# Patient Record
Sex: MALE | Race: WHITE | NOT HISPANIC OR LATINO | Employment: FULL TIME | URBAN - METROPOLITAN AREA
[De-identification: names, ages, dates, MRNs, and addresses within clinical notes are randomized per-mention and may not be internally consistent; named-entity substitution may affect disease eponyms.]

---

## 2017-08-29 ENCOUNTER — ALLSCRIPTS OFFICE VISIT (OUTPATIENT)
Dept: OTHER | Facility: OTHER | Age: 27
End: 2017-08-29

## 2017-08-30 DIAGNOSIS — R63.5 ABNORMAL WEIGHT GAIN: ICD-10-CM

## 2017-08-30 DIAGNOSIS — E66.9 OBESITY: ICD-10-CM

## 2017-08-30 DIAGNOSIS — F31.30 BIPOLAR DISORDER, CURRENT EPISODE DEPRESSED, MILD OR MODERATE SEVERITY, UNSPECIFIED (HCC): ICD-10-CM

## 2017-08-30 DIAGNOSIS — Z00.00 ENCOUNTER FOR GENERAL ADULT MEDICAL EXAMINATION WITHOUT ABNORMAL FINDINGS: ICD-10-CM

## 2017-08-30 DIAGNOSIS — E78.5 HYPERLIPIDEMIA: ICD-10-CM

## 2017-10-19 ENCOUNTER — GENERIC CONVERSION - ENCOUNTER (OUTPATIENT)
Dept: OTHER | Facility: OTHER | Age: 27
End: 2017-10-19

## 2017-10-20 ENCOUNTER — GENERIC CONVERSION - ENCOUNTER (OUTPATIENT)
Dept: OTHER | Facility: OTHER | Age: 27
End: 2017-10-20

## 2017-10-20 DIAGNOSIS — Z78.9 OTHER SPECIFIED HEALTH STATUS (CODE): ICD-10-CM

## 2017-10-26 ENCOUNTER — GENERIC CONVERSION - ENCOUNTER (OUTPATIENT)
Dept: OTHER | Facility: OTHER | Age: 27
End: 2017-10-26

## 2018-01-10 NOTE — PROGRESS NOTES
Assessment    1  Encounter for preventive health examination (V70 0) (Z00 00)   2  Obesity (BMI 30-39 9) (278 00) (E66 9)   3  Weight gain (783 1) (R63 5)   4  Hyperlipidemia (272 4) (E78 5)   5  Bipolar depression (296 50) (F31 30)    Plan  Bipolar depression, Health Maintenance, Hyperlipidemia, Obesity (BMI 30-39 9), Weight  gain    · Begin or continue regular aerobic exercise  Gradually work up to at least {count1}  sessions of {dur1} of exercise a week ; Status:Complete;   Done: 79JGG4323 03:08PM   · Continue with our present treatment plan ; Status:Complete;   Done: 91Bzw5459  03:08PM   · Eat a low fat and low cholesterol diet ; Status:Complete;   Done: 83GFD5768 03:08PM   · We recommend that you bring your body mass index down to 26 ; Status:Complete;    Done: 21BEX1642 03:08PM   · Follow-up visit in 1 month Evaluation and Treatment  Follow-up  Status: Hold For -  Scheduling  Requested for: 29Aug2017   · (1) CBC/ PLT (NO DIFF); Status:Active; Requested for:70Kuj8123;    · (1) COMPREHENSIVE METABOLIC PANEL; Status:Active; Requested for:80Spb6217;    · (1) CORTISOL AM SPECIMEN; Status:Active; Requested for:45Opx3626;    · (1) LIPID PANEL FASTING W DIRECT LDL REFLEX; Status:Active; Requested  for:53Wst1014;    · (1) TSH; Status:Active; Requested for:45Xwe1358;    · (1) URINALYSIS (will reflex a microscopy if leukocytes, occult blood, protein or nitrites  are not within normal limits); Status:Active; Requested for:07Khq0681;     Discussion/Summary    #1  Health maintenance, exam normal except for weight  #2  Hyperlipidemia, check blood work  #3  Obesity/weight gain, check blood work  #4  Bipolar depression, currently without symptoms offered counseling patient declined at the present time  #5  Patient to return in one month discussed blood work and monitor weight  The treatment plan was reviewed with the patient/guardian   The patient/guardian understands and agrees with the treatment plan     Self Referrals: No      Chief Complaint  pt here for general P E  wants to discuss why pt can not loose weight  History of Present Illness  HPI: Patient here for a "routine checkup"  Patient also questions he's trying to lose weight and is gaining weight  I'll blood work will be ordered  Patient is any history of hyperlipidemia, will recheck this  Patient has a history of anxiety/bipolar no current symptoms at the present time  He says it "comes and goes"      Review of Systems    Constitutional: as noted in HPI  Eyes: No complaints of eye pain, no red eyes, no discharge from eyes, no itchy eyes  ENT: no complaints of earache, no hearing loss, no nosebleeds, no nasal discharge, no sore throat, no hoarseness  Cardiovascular: No complaints of slow heart rate, no fast heart rate, no chest pain, no palpitations, no leg claudication, no lower extremity  Respiratory: No complaints of shortness of breath, no wheezing, no cough, no SOB on exertion, no orthopnea or PND  Gastrointestinal: No complaints of abdominal pain, no constipation, no nausea or vomiting, no diarrhea or bloody stools  Genitourinary: No complaints of dysuria, no incontinence, no hesitancy, no nocturia, no genital lesion, no testicular pain  Musculoskeletal: No complaints of arthralgia, no myalgias, no joint swelling or stiffness, no limb pain or swelling  Integumentary: No complaints of skin rash or skin lesions, no itching, no skin wound, no dry skin  Neurological: No compliants of headache, no confusion, no convulsions, no numbness or tingling, no dizziness or fainting, no limb weakness, no difficulty walking  Psychiatric: as noted in HPI  Endocrine: No complaints of proptosis, no hot flashes, no muscle weakness, no erectile dysfunction, no deepening of the voice, no feelings of weakness  Hematologic/Lymphatic: No complaints of swollen glands, no swollen glands in the neck, does not bleed easily, no easy bruising  Active Problems    1  Bipolar depression (296 50) (F31 30)   2  Hyperlipidemia (272 4) (E78 5)    Surgical History    · History of Shoulder Surgery    Family History  Mother    · Family history of Bipolar disorder (296 80) (F31 9)    Social History    · Denied: History of Alcohol use   · Denied: History of Drug use   · Never a smoker    Current Meds   1  Fish Oil Concentrate 1000 MG Oral Capsule; as directed on package; Therapy: 04Vpr7328 to (Last Rx:52Muq6753) Ordered    Allergies    1  Penicillins    Vitals   Recorded: 29Aug2017 03:00PM Recorded: 29Aug2017 02:58PM   Heart Rate  84   Systolic  147   Diastolic  90   Weight 451 lb     BMI Calculated 30 74    BSA Calculated 2 3      Physical Exam    Constitutional   General appearance: No acute distress, well appearing and well nourished  Eyes   Conjunctiva and lids: No swelling, erythema, or discharge  Pupils and irises: Equal, round and reactive to light  Ears, Nose, Mouth, and Throat   External inspection of ears and nose: Normal     Otoscopic examination: Tympanic membrance translucent with normal light reflex  Canals patent without erythema  Pulmonary   Respiratory effort: No increased work of breathing or signs of respiratory distress  Auscultation of lungs: Clear to auscultation  Cardiovascular   Palpation of heart: Normal PMI, no thrills  Auscultation of heart: Normal rate and rhythm, normal S1 and S2, without murmurs  Examination of extremities for edema and/or varicosities: Normal     Abdomen   Abdomen: Non-tender, no masses  Liver and spleen: No hepatomegaly or splenomegaly  Lymphatic   Palpation of lymph nodes in neck: No lymphadenopathy  Musculoskeletal   Gait and station: Normal     Skin Warm and dry  Neurologic   Cranial nerves: Cranial nerves 2-12 intact  Reflexes: 2+ and symmetric  Sensation: No sensory loss      Psychiatric   Orientation to person, place and time: Normal     Mood and affect: Normal        Future Appointments    Date/Time Provider Specialty Site   09/14/2017 04:00 PM Saida Hawkins MD Urology [address]     Signatures   Electronically signed by : Rei Nixon DO; Aug 29 2017  3:10PM EST                       (Author)

## 2018-01-10 NOTE — MISCELLANEOUS
Provider Comments  Provider Comments:   Patient did not show up for his scheduled appointment      Signatures   Electronically signed by : Odessa Mccann DO; Jun 17 2016  8:07AM EST                       (Author)

## 2018-01-10 NOTE — MISCELLANEOUS
Message  [facility] [address] [place] [address], NO DR OR DESTINY # ON PRESCRIPTION FOR [address]  PT SCHED FOR VAS 10/20  PRESCRIPTION PER DR FIGUEROA, INFO PROVIDED TO PHARM  Active Problems    1  Bipolar depression (296 50) (F31 30)   2  Hyperlipidemia (272 4) (E78 5)   3  Obesity (BMI 30-39 9) (278 00) (E66 9)   4  Weight gain (783 1) (R63 5)    Current Meds   1  Magnesium CAPS;    Therapy: (Recorded:45Jpw7377) to Recorded    Signatures   Electronically signed by : Ron Drew RN; Oct 19 2017  3:32PM EST                       (Author)

## 2018-01-11 NOTE — MISCELLANEOUS
Provider Comments  Provider Comments:   Patient did not show for scheduled appointment      Signatures   Electronically signed by : Andressa Fonseca DO; Nov 29 2016  4:17PM EST                       (Author)

## 2018-01-12 DIAGNOSIS — Z98.52 VASECTOMY STATUS: ICD-10-CM

## 2018-01-14 VITALS
SYSTOLIC BLOOD PRESSURE: 126 MMHG | HEART RATE: 84 BPM | DIASTOLIC BLOOD PRESSURE: 90 MMHG | WEIGHT: 233 LBS | BODY MASS INDEX: 30.74 KG/M2

## 2018-01-22 VITALS
DIASTOLIC BLOOD PRESSURE: 82 MMHG | WEIGHT: 225 LBS | BODY MASS INDEX: 29.82 KG/M2 | HEIGHT: 73 IN | SYSTOLIC BLOOD PRESSURE: 124 MMHG

## 2018-05-15 ENCOUNTER — OFFICE VISIT (OUTPATIENT)
Dept: FAMILY MEDICINE CLINIC | Facility: CLINIC | Age: 28
End: 2018-05-15
Payer: COMMERCIAL

## 2018-05-15 VITALS
SYSTOLIC BLOOD PRESSURE: 120 MMHG | HEART RATE: 108 BPM | WEIGHT: 229 LBS | TEMPERATURE: 99.7 F | DIASTOLIC BLOOD PRESSURE: 94 MMHG | BODY MASS INDEX: 29.39 KG/M2 | HEIGHT: 74 IN

## 2018-05-15 DIAGNOSIS — R80.9 PROTEINURIA, UNSPECIFIED TYPE: ICD-10-CM

## 2018-05-15 DIAGNOSIS — R35.0 URINARY FREQUENCY: Primary | ICD-10-CM

## 2018-05-15 DIAGNOSIS — Z13.6 SCREENING FOR HEART DISEASE: ICD-10-CM

## 2018-05-15 PROBLEM — E66.9 OBESITY (BMI 30-39.9): Status: ACTIVE | Noted: 2017-08-29

## 2018-05-15 LAB
SL AMB  POCT GLUCOSE, UA: ABNORMAL
SL AMB LEUKOCYTE ESTERASE,UA: ABNORMAL
SL AMB POCT BILIRUBIN,UA: ABNORMAL
SL AMB POCT BLOOD,UA: ABNORMAL
SL AMB POCT CLARITY,UA: CLEAR
SL AMB POCT COLOR,UA: YELLOW
SL AMB POCT KETONES,UA: ABNORMAL
SL AMB POCT NITRITE,UA: ABNORMAL
SL AMB POCT PH,UA: 6
SL AMB POCT SPECIFIC GRAVITY,UA: 1.02
SL AMB POCT URINE PROTEIN: ABNORMAL
SL AMB POCT UROBILINOGEN: 0.2

## 2018-05-15 PROCEDURE — 81002 URINALYSIS NONAUTO W/O SCOPE: CPT | Performed by: PHYSICIAN ASSISTANT

## 2018-05-15 PROCEDURE — 99214 OFFICE O/P EST MOD 30 MIN: CPT | Performed by: PHYSICIAN ASSISTANT

## 2018-05-15 NOTE — PATIENT INSTRUCTIONS
1  Urinary frequency-this may likely be secondary to the quantity of fluids that he is drinking or bladder spasms related to caffeine consumption  We will assess labs to rule out diabetes and renal function problems  I will also collect a 24 hour urine protein secondary to his trace protein on urinalysis  2   Proteinuria-check 24 hour protein collection as above  3   Screening for heart disease-will assess lipid panel with labs

## 2018-05-15 NOTE — PROGRESS NOTES
Assessment/Plan:  Patient Instructions   1  Urinary frequency-this may likely be secondary to the quantity of fluids that he is drinking or bladder spasms related to caffeine consumption  We will assess labs to rule out diabetes and renal function problems  I will also collect a 24 hour urine protein secondary to his trace protein on urinalysis  2   Proteinuria-check 24 hour protein collection as above  3   Screening for heart disease-will assess lipid panel with labs  Diagnoses and all orders for this visit:    Urinary frequency  -     POCT urine dip  -     Protein, Total W/Creat, 24 Hour Urine  -     CBC and differential  -     Comprehensive metabolic panel  -     TSH, 3rd generation with T4 reflex  -     PSA, Total Screen  -     Lipid Panel with Direct LDL reflex    Proteinuria, unspecified type    Screening for heart disease  -     Protein, Total W/Creat, 24 Hour Urine  -     CBC and differential  -     Comprehensive metabolic panel  -     TSH, 3rd generation with T4 reflex  -     PSA, Total Screen  -     Lipid Panel with Direct LDL reflex          Subjective:   c/o urinates approx every 45 minutes  Gets sudden urge and pressure to go  Onset a few months  He drinks 24oz coffee, 1 soda and 8-15 bottles of water daily  mjs     Patient ID: Trent Parker is a 28 y o  male  HPI:  This is a 41-year-old gentleman that presents to the office with concerns over urinary frequency  He has been having regular urination every 45 minutes  He has episodes where he feels that he has to go suddenly and quickly or his bladder will explode  He typically does have a large quantity of urine  He drinks a lot throughout the course of the day, typically 8, 16 oz bottles of water and some soda and a 24 oz coffee mixed in  He states that on an hour and a half car trip E had to stop twice recently to urinate  He does not have any burning or discomfort with urination          The following portions of the patient's history were reviewed and updated as appropriate: allergies, current medications, past family history, past medical history, past social history, past surgical history and problem list     Review of Systems   Constitutional: Negative for chills, fatigue and fever  HENT: Negative for congestion, ear pain and sinus pressure  Eyes: Negative for visual disturbance  Respiratory: Negative for cough, chest tightness and shortness of breath  Cardiovascular: Negative for chest pain and palpitations  Gastrointestinal: Negative for diarrhea, nausea and vomiting  Endocrine: Negative for polyuria  Genitourinary: Positive for urgency  Negative for dysuria and frequency  Urinary frequency   Musculoskeletal: Negative for arthralgias and myalgias  Skin: Negative for pallor and rash  Neurological: Negative for dizziness, weakness, light-headedness, numbness and headaches  Psychiatric/Behavioral: Negative for agitation, behavioral problems and sleep disturbance  All other systems reviewed and are negative  Objective:      /94   Pulse (!) 108   Temp 99 7 °F (37 6 °C)   Ht 6' 1 5" (1 867 m)   Wt 104 kg (229 lb)   BMI 29 80 kg/m²          Physical Exam   Constitutional: He is oriented to person, place, and time  He appears well-developed and well-nourished  No distress  HENT:   Head: Normocephalic and atraumatic  Right Ear: External ear normal    Left Ear: External ear normal    Nose: Nose normal    Mouth/Throat: Oropharynx is clear and moist  No oropharyngeal exudate  Eyes: Conjunctivae and EOM are normal  Pupils are equal, round, and reactive to light  Neck: Normal range of motion  Neck supple  No tracheal deviation present  No thyromegaly present  Cardiovascular: Normal rate, regular rhythm and normal heart sounds  Exam reveals no friction rub  No murmur heard  Pulmonary/Chest: Effort normal and breath sounds normal  No respiratory distress  He has no wheezes  He has no rales  Abdominal: Soft  Bowel sounds are normal  He exhibits no distension  There is no tenderness  There is no rebound and no guarding  Musculoskeletal: Normal range of motion  He exhibits no edema or tenderness  Lymphadenopathy:     He has no cervical adenopathy  Neurological: He is alert and oriented to person, place, and time  No cranial nerve deficit  Coordination normal    Skin: Skin is warm and dry  No rash noted  No erythema  Psychiatric: He has a normal mood and affect  His behavior is normal  Thought content normal    Nursing note and vitals reviewed

## 2018-05-30 ENCOUNTER — APPOINTMENT (OUTPATIENT)
Dept: LAB | Facility: MEDICAL CENTER | Age: 28
End: 2018-05-30
Payer: COMMERCIAL

## 2018-05-30 DIAGNOSIS — Z13.6 SCREENING FOR ISCHEMIC HEART DISEASE: ICD-10-CM

## 2018-05-30 DIAGNOSIS — R35.0 URINARY FREQUENCY: Primary | ICD-10-CM

## 2018-05-30 LAB
ALBUMIN SERPL BCP-MCNC: 4 G/DL (ref 3.5–5)
ALP SERPL-CCNC: 55 U/L (ref 46–116)
ALT SERPL W P-5'-P-CCNC: 61 U/L (ref 12–78)
ANION GAP SERPL CALCULATED.3IONS-SCNC: 7 MMOL/L (ref 4–13)
AST SERPL W P-5'-P-CCNC: 26 U/L (ref 5–45)
BASOPHILS # BLD AUTO: 0.03 THOUSANDS/ΜL (ref 0–0.1)
BASOPHILS NFR BLD AUTO: 1 % (ref 0–1)
BILIRUB SERPL-MCNC: 0.51 MG/DL (ref 0.2–1)
BUN SERPL-MCNC: 9 MG/DL (ref 5–25)
CALCIUM SERPL-MCNC: 8.8 MG/DL (ref 8.3–10.1)
CHLORIDE SERPL-SCNC: 105 MMOL/L (ref 100–108)
CHOLEST SERPL-MCNC: 184 MG/DL (ref 50–200)
CO2 SERPL-SCNC: 28 MMOL/L (ref 21–32)
CREAT 24H UR-MRATE: 3.6 G/24HR (ref 0.8–1.8)
CREAT SERPL-MCNC: 0.88 MG/DL (ref 0.6–1.3)
EOSINOPHIL # BLD AUTO: 0.12 THOUSAND/ΜL (ref 0–0.61)
EOSINOPHIL NFR BLD AUTO: 2 % (ref 0–6)
ERYTHROCYTE [DISTWIDTH] IN BLOOD BY AUTOMATED COUNT: 12.3 % (ref 11.6–15.1)
GFR SERPL CREATININE-BSD FRML MDRD: 117 ML/MIN/1.73SQ M
GLUCOSE P FAST SERPL-MCNC: 91 MG/DL (ref 65–99)
HCT VFR BLD AUTO: 48.7 % (ref 36.5–49.3)
HDLC SERPL-MCNC: 36 MG/DL (ref 40–60)
HGB BLD-MCNC: 15.5 G/DL (ref 12–17)
IMM GRANULOCYTES # BLD AUTO: 0.01 THOUSAND/UL (ref 0–0.2)
IMM GRANULOCYTES NFR BLD AUTO: 0 % (ref 0–2)
LDLC SERPL CALC-MCNC: 115 MG/DL (ref 0–100)
LYMPHOCYTES # BLD AUTO: 1.96 THOUSANDS/ΜL (ref 0.6–4.47)
LYMPHOCYTES NFR BLD AUTO: 32 % (ref 14–44)
MCH RBC QN AUTO: 29.9 PG (ref 26.8–34.3)
MCHC RBC AUTO-ENTMCNC: 31.8 G/DL (ref 31.4–37.4)
MCV RBC AUTO: 94 FL (ref 82–98)
MONOCYTES # BLD AUTO: 0.62 THOUSAND/ΜL (ref 0.17–1.22)
MONOCYTES NFR BLD AUTO: 10 % (ref 4–12)
NEUTROPHILS # BLD AUTO: 3.35 THOUSANDS/ΜL (ref 1.85–7.62)
NEUTS SEG NFR BLD AUTO: 55 % (ref 43–75)
NRBC BLD AUTO-RTO: 0 /100 WBCS
PLATELET # BLD AUTO: 234 THOUSANDS/UL (ref 149–390)
PMV BLD AUTO: 11.6 FL (ref 8.9–12.7)
POTASSIUM SERPL-SCNC: 4.4 MMOL/L (ref 3.5–5.3)
PROT 24H UR-MCNC: 242 MG/24 HRS (ref 40–150)
PROT SERPL-MCNC: 7.6 G/DL (ref 6.4–8.2)
PSA SERPL-MCNC: 0.3 NG/ML (ref 0–4)
RBC # BLD AUTO: 5.19 MILLION/UL (ref 3.88–5.62)
SODIUM SERPL-SCNC: 140 MMOL/L (ref 136–145)
SPECIMEN VOL UR: 2200 ML
SPECIMEN VOL UR: 2200 ML
TRIGL SERPL-MCNC: 166 MG/DL
TSH SERPL DL<=0.05 MIU/L-ACNC: 1.34 UIU/ML (ref 0.36–3.74)
WBC # BLD AUTO: 6.09 THOUSAND/UL (ref 4.31–10.16)

## 2018-05-30 PROCEDURE — 80061 LIPID PANEL: CPT | Performed by: PHYSICIAN ASSISTANT

## 2018-05-30 PROCEDURE — 82570 ASSAY OF URINE CREATININE: CPT

## 2018-05-30 PROCEDURE — 84156 ASSAY OF PROTEIN URINE: CPT

## 2018-05-30 PROCEDURE — G0103 PSA SCREENING: HCPCS | Performed by: PHYSICIAN ASSISTANT

## 2018-05-30 PROCEDURE — 80053 COMPREHEN METABOLIC PANEL: CPT | Performed by: PHYSICIAN ASSISTANT

## 2018-05-30 PROCEDURE — 85025 COMPLETE CBC W/AUTO DIFF WBC: CPT | Performed by: PHYSICIAN ASSISTANT

## 2018-05-30 PROCEDURE — 36415 COLL VENOUS BLD VENIPUNCTURE: CPT | Performed by: PHYSICIAN ASSISTANT

## 2018-05-30 PROCEDURE — 84443 ASSAY THYROID STIM HORMONE: CPT | Performed by: PHYSICIAN ASSISTANT

## 2018-06-04 DIAGNOSIS — R80.9 URINE PROTEIN INCREASED: Primary | ICD-10-CM

## 2018-06-12 ENCOUNTER — OFFICE VISIT (OUTPATIENT)
Dept: UROLOGY | Facility: MEDICAL CENTER | Age: 28
End: 2018-06-12
Payer: COMMERCIAL

## 2018-06-12 VITALS
SYSTOLIC BLOOD PRESSURE: 120 MMHG | WEIGHT: 228 LBS | DIASTOLIC BLOOD PRESSURE: 88 MMHG | BODY MASS INDEX: 30.22 KG/M2 | HEIGHT: 73 IN

## 2018-06-12 DIAGNOSIS — N42.82 PROSTATITIS SYNDROME: ICD-10-CM

## 2018-06-12 DIAGNOSIS — R80.9 URINE PROTEIN INCREASED: Primary | ICD-10-CM

## 2018-06-12 LAB
SL AMB  POCT GLUCOSE, UA: NORMAL
SL AMB LEUKOCYTE ESTERASE,UA: NORMAL
SL AMB POCT BILIRUBIN,UA: NORMAL
SL AMB POCT BLOOD,UA: NORMAL
SL AMB POCT CLARITY,UA: CLEAR
SL AMB POCT COLOR,UA: YELLOW
SL AMB POCT KETONES,UA: NORMAL
SL AMB POCT NITRITE,UA: NORMAL
SL AMB POCT PH,UA: 7
SL AMB POCT SPECIFIC GRAVITY,UA: 1.01
SL AMB POCT URINE PROTEIN: NORMAL
SL AMB POCT UROBILINOGEN: 0.2

## 2018-06-12 PROCEDURE — 99214 OFFICE O/P EST MOD 30 MIN: CPT | Performed by: UROLOGY

## 2018-06-12 PROCEDURE — 81003 URINALYSIS AUTO W/O SCOPE: CPT | Performed by: UROLOGY

## 2018-06-12 RX ORDER — SULFAMETHOXAZOLE AND TRIMETHOPRIM 800; 160 MG/1; MG/1
1 TABLET ORAL EVERY 12 HOURS SCHEDULED
Qty: 28 TABLET | Refills: 1 | Status: SHIPPED | OUTPATIENT
Start: 2018-06-12 | End: 2018-06-26

## 2018-06-12 NOTE — LETTER
June 12, 2018     Madelyn Hernandez, DO  [address]    Patient: Aditi Araya   YOB: 1990   Date of Visit: 6/12/2018       Dear Dr Naida Fox: Thank you for referring Juliana Coyle to me for evaluation  Below are my notes for this consultation  If you have questions, please do not hesitate to call me  I look forward to following your patient along with you  Sincerely,        Ryan Melo MD        CC: No Recipients  Ryan Melo MD  6/12/2018  4:26 PM  Sign at close encounter  Assessment/Plan:  1  Prostatitis syndrome-the patient notes an episode of significant pain in the perineum and lower suprapubic portion of the abdomen right immediately after ejaculation during intercourse  The patient denies any penile trauma denies any hematospermia and notes no hematuria  He is voiding adequately albeit with some slight urinary frequency and occasional double voiding  Patient presents for evaluation and treatment  2   Vasectomy performed 1 year ago  Patient has not provided post vasectomy sperm counts, was advised and prefers not to submit them at this time  He is aware of the possibility of vasectomy failure    No problem-specific Assessment & Plan notes found for this encounter  Diagnoses and all orders for this visit:    Urine protein increased  -     POCT urine dip auto non-scope    Prostatitis syndrome  -     sulfamethoxazole-trimethoprim (BACTRIM DS) 800-160 mg per tablet; Take 1 tablet by mouth every 12 (twelve) hours for 14 days    Other orders  -     Magnesium Gluconate (MAGNESIUM 27 PO); Take by mouth as needed          Subjective:      Patient ID: Aditi Araya is a 28 y o  male  HPI 41-year-old male complains of post ejaculatory discomfort on 1 episode  The patient denies any dysuria notes only mild urinary frequency denies urgency incontinence and nocturia  He denies any antecedent trauma gross hematuria    The following portions of the patient's history were reviewed and updated as appropriate: allergies, current medications, past family history, past medical history, past social history, past surgical history and problem list     Review of Systems   Genitourinary:        Proteinuria, will be seeing Nephrology   All other systems reviewed and are negative  Objective:      /88   Ht 6' 1" (1 854 m)   Wt 103 kg (228 lb)   BMI 30 08 kg/m²           Physical Exam   Constitutional: He is oriented to person, place, and time  He appears well-developed and well-nourished  HENT:   Head: Normocephalic and atraumatic  Eyes: Conjunctivae and EOM are normal    Neck: Neck supple  Pulmonary/Chest: Effort normal  No respiratory distress  Abdominal: Soft  Bowel sounds are normal  He exhibits no distension  There is no tenderness  There is no rebound  Genitourinary:   Genitourinary Comments: Phallus normal with a redundant prep is  Meatus patent normally placed  Scrotum normal without cutaneous lesions  Testes and adnexa are palpably normal with some nodularity at the vasectomy sites bilaterally  Epididymis bilaterally is minimally full and nontender  Perineum is palpably normal HALIE reveals a normal anal verge normal anal sphincter tone no palpable rectal masses and a nodular nontender 20 g prostate  Neurological: He is alert and oriented to person, place, and time  Psychiatric: He has a normal mood and affect   His behavior is normal  Judgment and thought content normal

## 2018-06-12 NOTE — PROGRESS NOTES
Assessment/Plan:  1  Prostatitis syndrome-the patient notes an episode of significant pain in the perineum and lower suprapubic portion of the abdomen right immediately after ejaculation during intercourse  The patient denies any penile trauma denies any hematospermia and notes no hematuria  He is voiding adequately albeit with some slight urinary frequency and occasional double voiding  Patient presents for evaluation and treatment  2   Vasectomy performed 1 year ago  Patient has not provided post vasectomy sperm counts, was advised and prefers not to submit them at this time  He is aware of the possibility of vasectomy failure    No problem-specific Assessment & Plan notes found for this encounter  Diagnoses and all orders for this visit:    Urine protein increased  -     POCT urine dip auto non-scope    Prostatitis syndrome  -     sulfamethoxazole-trimethoprim (BACTRIM DS) 800-160 mg per tablet; Take 1 tablet by mouth every 12 (twelve) hours for 14 days    Other orders  -     Magnesium Gluconate (MAGNESIUM 27 PO); Take by mouth as needed          Subjective:      Patient ID: Quintin Guadalupe is a 28 y o  male  HPI 69-year-old male complains of post ejaculatory discomfort on 1 episode  The patient denies any dysuria notes only mild urinary frequency denies urgency incontinence and nocturia  He denies any antecedent trauma gross hematuria  The following portions of the patient's history were reviewed and updated as appropriate: allergies, current medications, past family history, past medical history, past social history, past surgical history and problem list     Review of Systems   Genitourinary:        Proteinuria, will be seeing Nephrology   All other systems reviewed and are negative  Objective:      /88   Ht 6' 1" (1 854 m)   Wt 103 kg (228 lb)   BMI 30 08 kg/m²          Physical Exam   Constitutional: He is oriented to person, place, and time   He appears well-developed and well-nourished  HENT:   Head: Normocephalic and atraumatic  Eyes: Conjunctivae and EOM are normal    Neck: Neck supple  Pulmonary/Chest: Effort normal  No respiratory distress  Abdominal: Soft  Bowel sounds are normal  He exhibits no distension  There is no tenderness  There is no rebound  Genitourinary:   Genitourinary Comments: Phallus normal with a redundant prep is  Meatus patent normally placed  Scrotum normal without cutaneous lesions  Testes and adnexa are palpably normal with some nodularity at the vasectomy sites bilaterally  Epididymis bilaterally is minimally full and nontender  Perineum is palpably normal HALIE reveals a normal anal verge normal anal sphincter tone no palpable rectal masses and a nodular nontender 20 g prostate  Neurological: He is alert and oriented to person, place, and time  Psychiatric: He has a normal mood and affect   His behavior is normal  Judgment and thought content normal

## 2018-07-05 ENCOUNTER — TELEPHONE (OUTPATIENT)
Dept: FAMILY MEDICINE CLINIC | Facility: CLINIC | Age: 28
End: 2018-07-05

## 2018-07-05 NOTE — TELEPHONE ENCOUNTER
FATOU WITH [facility]'S NEPHROLOGY CALLED  PT WAS A NO SHOW FOR THIS APPT ON 7/3/18  THEREFORE THEY HAVE CLOSED THE REFERRAL  IF YOU WOULD STILL LIKE PT SEEN-A NEW REFERRAL WILL NEED TO BE DONE

## 2018-10-30 ENCOUNTER — TELEPHONE (OUTPATIENT)
Dept: NEPHROLOGY | Facility: CLINIC | Age: 28
End: 2018-10-30

## 2018-10-30 NOTE — TELEPHONE ENCOUNTER
Called patient to see if he would like to reschedule his new patient appt he missed back in July and he stated we should call him back in December to schedule when he will have new insurance   I will put him on Decembers recall list

## 2018-12-21 ENCOUNTER — TELEPHONE (OUTPATIENT)
Dept: NEPHROLOGY | Facility: CLINIC | Age: 28
End: 2018-12-21

## 2018-12-21 NOTE — TELEPHONE ENCOUNTER
Left message for patient to see if he would like to schedule the new patient appointment that was missed in July

## 2019-02-05 ENCOUNTER — TELEPHONE (OUTPATIENT)
Dept: NEPHROLOGY | Facility: CLINIC | Age: 29
End: 2019-02-05

## 2019-04-07 ENCOUNTER — TELEPHONE (OUTPATIENT)
Dept: URGENT CARE | Facility: MEDICAL CENTER | Age: 29
End: 2019-04-07

## 2019-04-07 ENCOUNTER — APPOINTMENT (OUTPATIENT)
Dept: RADIOLOGY | Facility: MEDICAL CENTER | Age: 29
End: 2019-04-07
Payer: COMMERCIAL

## 2019-04-07 ENCOUNTER — OFFICE VISIT (OUTPATIENT)
Dept: URGENT CARE | Facility: MEDICAL CENTER | Age: 29
End: 2019-04-07
Payer: COMMERCIAL

## 2019-04-07 ENCOUNTER — TRANSCRIBE ORDERS (OUTPATIENT)
Dept: URGENT CARE | Facility: MEDICAL CENTER | Age: 29
End: 2019-04-07

## 2019-04-07 VITALS
TEMPERATURE: 98 F | RESPIRATION RATE: 16 BRPM | OXYGEN SATURATION: 99 % | DIASTOLIC BLOOD PRESSURE: 70 MMHG | HEART RATE: 76 BPM | SYSTOLIC BLOOD PRESSURE: 126 MMHG

## 2019-04-07 DIAGNOSIS — S79.919A HIP INJURY, INITIAL ENCOUNTER: ICD-10-CM

## 2019-04-07 DIAGNOSIS — S32.426A: Primary | ICD-10-CM

## 2019-04-07 PROCEDURE — 72100 X-RAY EXAM L-S SPINE 2/3 VWS: CPT

## 2019-04-07 PROCEDURE — G0383 LEV 4 HOSP TYPE B ED VISIT: HCPCS | Performed by: FAMILY MEDICINE

## 2019-04-07 PROCEDURE — 73502 X-RAY EXAM HIP UNI 2-3 VIEWS: CPT

## 2019-04-24 ENCOUNTER — TELEPHONE (OUTPATIENT)
Dept: OBGYN CLINIC | Facility: MEDICAL CENTER | Age: 29
End: 2019-04-24

## 2020-02-24 ENCOUNTER — OFFICE VISIT (OUTPATIENT)
Dept: FAMILY MEDICINE CLINIC | Facility: CLINIC | Age: 30
End: 2020-02-24
Payer: COMMERCIAL

## 2020-02-24 VITALS
WEIGHT: 237 LBS | BODY MASS INDEX: 30.42 KG/M2 | HEART RATE: 56 BPM | RESPIRATION RATE: 18 BRPM | SYSTOLIC BLOOD PRESSURE: 120 MMHG | HEIGHT: 74 IN | DIASTOLIC BLOOD PRESSURE: 82 MMHG

## 2020-02-24 DIAGNOSIS — E66.9 OBESITY (BMI 30-39.9): ICD-10-CM

## 2020-02-24 DIAGNOSIS — F31.9 BIPOLAR DEPRESSION (HCC): ICD-10-CM

## 2020-02-24 DIAGNOSIS — R80.9 URINE PROTEIN INCREASED: ICD-10-CM

## 2020-02-24 DIAGNOSIS — E78.5 HYPERLIPIDEMIA, UNSPECIFIED HYPERLIPIDEMIA TYPE: ICD-10-CM

## 2020-02-24 DIAGNOSIS — Z00.00 HEALTH CARE MAINTENANCE: Primary | ICD-10-CM

## 2020-02-24 PROCEDURE — 99395 PREV VISIT EST AGE 18-39: CPT | Performed by: FAMILY MEDICINE

## 2020-02-24 PROCEDURE — 3008F BODY MASS INDEX DOCD: CPT | Performed by: FAMILY MEDICINE

## 2020-02-24 NOTE — PATIENT INSTRUCTIONS
Low Fat Diet   AMBULATORY CARE:   A low-fat diet  is an eating plan that is low in total fat, unhealthy fat, and cholesterol  You may need to follow a low-fat diet if you have trouble digesting or absorbing fat  You may also need to follow this diet if you have high cholesterol  You can also lower your cholesterol by increasing the amount of fiber in your diet  Soluble fiber is a type of fiber that helps to decrease cholesterol levels  Different types of fat in food:   · Limit unhealthy fats  A diet that is high in cholesterol, saturated fat, and trans fat may cause unhealthy cholesterol levels  Unhealthy cholesterol levels increase your risk of heart disease  ¨ Cholesterol:  Limit intake of cholesterol to less than 200 mg per day  Cholesterol is found in meat, eggs, and dairy  ¨ Saturated fat:  Limit saturated fat to less than 7% of your total daily calories  Ask your dietitian how many calories you need each day  Saturated fat is found in butter, cheese, ice cream, whole milk, and palm oil  Saturated fat is also found in meat, such as beef, pork, chicken skin, and processed meats  Processed meats include sausage, hot dogs, and bologna  ¨ Trans fat:  Avoid trans fat as much as possible  Trans fat is used in fried and baked foods  Foods that say trans fat free on the label may still have up to 0 5 grams of trans fat per serving  · Include healthy fats  Replace foods that are high in saturated and trans fat with foods high in healthy fats  This may help to decrease high cholesterol levels  ¨ Monounsaturated fats: These are found in avocados, nuts, and vegetable oils, such as olive, canola, and sunflower oil  ¨ Polyunsaturated fats: These can be found in vegetable oils, such as soybean or corn oil  Omega-3 fats can help to decrease the risk of heart disease  Omega-3 fats are found in fish, such as salmon, herring, trout, and tuna   Omega-3 fats can also be found in plant foods, such as walnuts, flaxseed, soybeans, and canola oil    Foods to limit or avoid:   · Grains:      ¨ Snacks that are made with partially hydrogenated oils, such as chips, regular crackers, and butter-flavored popcorn    ¨ High-fat baked goods, such as biscuits, croissants, doughnuts, pies, cookies, and pastries    · Dairy:      ¨ Whole milk, 2% milk, and yogurt and ice cream made with whole milk    ¨ Half and half creamer, heavy cream, and whipping cream    ¨ Cheese, cream cheese, and sour cream    · Meats and proteins:      ¨ High-fat cuts of meat (T-bone steak, regular hamburger, and ribs)    ¨ Fried meat, poultry (turkey and chicken), and fish    ¨ Poultry (chicken and turkey) with skin    ¨ Cold cuts (salami or bologna), hot dogs, lance, and sausage    ¨ Whole eggs and egg yolks    · Vegetables and fruits with added fat:      ¨ Fried vegetables or vegetables in butter or high-fat sauces, such as cream or cheese sauces    ¨ Fried fruit or fruit served with butter or cream    · Fats:      ¨ Butter, stick margarine, and shortening    ¨ Coconut, palm oil, and palm kernel oil  Foods to include:   · Grains:      ¨ Whole-grain breads, cereals, pasta, and brown rice    ¨ Low-fat crackers and pretzels    · Vegetables and fruits:      ¨ Fresh, frozen, or canned vegetables (no salt or low-sodium)    ¨ Fresh, frozen, dried, or canned fruit (canned in light syrup or fruit juice)    ¨ Avocado    · Low-fat dairy products:      ¨ Nonfat (skim) or 1% milk    ¨ Nonfat or low-fat cheese, yogurt, and cottage cheese    · Meats and proteins:      ¨ Chicken or turkey with no skin    ¨ Baked or broiled fish    ¨ Lean beef and pork (loin, round, extra lean hamburger)    ¨ Beans and peas, unsalted nuts, soy products    ¨ Egg whites and substitutes    ¨ Seeds and nuts    · Fats:      ¨ Unsaturated oil, such as canola, olive, peanut, soybean, or sunflower oil    ¨ Soft or liquid margarine and vegetable oil spread    ¨ Low-fat salad dressing  Other ways to decrease fat:   · Read food labels before you buy foods  Choose foods that have less than 30% of calories from fat  Choose low-fat or fat-free dairy products  Remember that fat free does not mean calorie free  These foods still contain calories, and too many calories can lead to weight gain  · Trim fat from meat and avoid fried food  Trim all visible fat from meat before you cook it  Remove the skin from poultry  Do not gomez meat, fish, or poultry  Bake, roast, boil, or broil these foods instead  Avoid fried foods  Eat a baked potato instead of Western Teresa fries  Steam vegetables instead of sautéing them in butter  · Add less fat to foods  Use imitation lance bits on salads and baked potatoes instead of regular lance bits  Use fat-free or low-fat salad dressings instead of regular dressings  Use low-fat or nonfat butter-flavored topping instead of regular butter or margarine on popcorn and other foods  Ways to decrease fat in recipes:  Replace high-fat ingredients with low-fat or nonfat ones  This may cause baked goods to be drier than usual  You may need to use nonfat cooking spray on pans to prevent food from sticking  You also may need to change the amount of other ingredients, such as water, in the recipe  Try the following:  · Use low-fat or light margarine instead of regular margarine or shortening  · Use lean ground turkey breast or chicken, or lean ground beef (less than 5% fat) instead of hamburger  · Add 1 teaspoon of canola oil to 8 ounces of skim milk instead of using cream or half and half  · Use grated zucchini, carrots, or apples in breads instead of coconut  · Use blenderized, low-fat cottage cheese, plain tofu, or low-fat ricotta cheese instead of cream cheese  · Use 1 egg white and 1 teaspoon of canola oil, or use ¼ cup (2 ounces) of fat-free egg substitute instead of a whole egg       · Replace half of the oil that is called for in a recipe with applesauce when you bake  Use 3 tablespoons of cocoa powder and 1 tablespoon of canola oil instead of a square of baking chocolate  How to increase fiber:  Eat enough high-fiber foods to get 20 to 30 grams of fiber every day  Slowly increase your fiber intake to avoid stomach cramps, gas, and other problems  · Eat 3 ounces of whole-grain foods each day  An ounce is about 1 slice of bread  Eat whole-grain breads, such as whole-wheat bread  Whole wheat, whole-wheat flour, or other whole grains should be listed as the first ingredient on the food label  Replace white flour with whole-grain flour or use half of each in recipes  Whole-grain flour is heavier than white flour, so you may have to add more yeast or baking powder  · Eat a high-fiber cereal for breakfast   Oatmeal is a good source of soluble fiber  Look for cereals that have bran or fiber in the name  Choose whole-grain products, such as brown rice, barley, and whole-wheat pasta  · Eat more beans, peas, and lentils  For example, add beans to soups or salads  Eat at least 5 cups of fruits and vegetables each day  Eat fruits and vegetables with the peel because the peel is high in fiber  © 2017 [employer] Information is for End User's use only and may not be sold, redistributed or otherwise used for commercial purposes  All illustrations and images included in CareNotes® are the copyrighted property of [employer]  or [employer]  The above information is an  only  It is not intended as medical advice for individual conditions or treatments  Talk to your doctor, nurse or pharmacist before following any medical regimen to see if it is safe and effective for you  Diet exercise weight loss recommended low-fat diet as above  Complete blood work as ordered    Return in 1 year

## 2020-02-24 NOTE — PROGRESS NOTES
Assessment and Plan:  1  Health maintenance exams completed blood work is ordered  Up-to-date with tetanus  2  Hyperlipidemia low-fat diet printed for the patient  Blood work is ordered  3  BMI of 30 43 discussed diet exercise weight loss recommended  4  Urine protein increase, urinalysis was ordered  5  Bipolar depression, stable see Psychiatry on a monthly basis is treated without medications  6  Return in 1 year sooner if needed      Problem List Items Addressed This Visit        Other    Obesity (BMI 30-39  9)     BMI 30 43, patient gained 9 lb diet exercise weight loss recommended         Relevant Orders    CBC    Comprehensive metabolic panel    Lipid Panel with Direct LDL reflex    TSH, 3rd generation with Free T4 reflex    UA (URINE) with reflex to Scope    Hyperlipidemia     Blood work ordered         Relevant Orders    CBC    Comprehensive metabolic panel    Lipid Panel with Direct LDL reflex    TSH, 3rd generation with Free T4 reflex    UA (URINE) with reflex to Scope    Bipolar depression [facility])     See psychiatrist on a monthly basis is controlled without medication         Relevant Orders    CBC    Comprehensive metabolic panel    Lipid Panel with Direct LDL reflex    TSH, 3rd generation with Free T4 reflex    UA (URINE) with reflex to Scope    Urine protein increased     Urinalysis ordered         Relevant Orders    CBC    Comprehensive metabolic panel    Lipid Panel with Direct LDL reflex    TSH, 3rd generation with Free T4 reflex    UA (URINE) with reflex to Scope    Health care maintenance - Primary     Exam was completed  Blood work was ordered  Tetanus up-to-date         Relevant Orders    CBC    Comprehensive metabolic panel    Lipid Panel with Direct LDL reflex    TSH, 3rd generation with Free T4 reflex    UA (URINE) with reflex to Scope                 Diagnoses and all orders for this visit:    Health care maintenance  -     CBC; Future  -     Comprehensive metabolic panel;  Future  - Lipid Panel with Direct LDL reflex; Future  -     TSH, 3rd generation with Free T4 reflex; Future  -     UA (URINE) with reflex to Scope; Future    Hyperlipidemia, unspecified hyperlipidemia type  -     CBC; Future  -     Comprehensive metabolic panel; Future  -     Lipid Panel with Direct LDL reflex; Future  -     TSH, 3rd generation with Free T4 reflex; Future  -     UA (URINE) with reflex to Scope; Future    Obesity (BMI 30-39 9)  -     CBC; Future  -     Comprehensive metabolic panel; Future  -     Lipid Panel with Direct LDL reflex; Future  -     TSH, 3rd generation with Free T4 reflex; Future  -     UA (URINE) with reflex to Scope; Future    Urine protein increased  -     CBC; Future  -     Comprehensive metabolic panel; Future  -     Lipid Panel with Direct LDL reflex; Future  -     TSH, 3rd generation with Free T4 reflex; Future  -     UA (URINE) with reflex to Scope; Future    Bipolar depression (HCC)  -     CBC; Future  -     Comprehensive metabolic panel; Future  -     Lipid Panel with Direct LDL reflex; Future  -     TSH, 3rd generation with Free T4 reflex; Future  -     UA (URINE) with reflex to Scope; Future              Subjective:      Patient ID: Verna Yan is a 26 y o  male  CC:    Chief Complaint   Patient presents with    Physical Exam     Patient present today for his Annual Physical exam and to discuss about his Cholesterol  Pt will like to get blood work done since his Cholesterol was high the last time he checked it  HPI:    Patient is doing well  He has gained 9 lb since last office visit  Patient has history of high cholesterol 1 white blood work ordered  Patient does have a history of bipolar depression he seeing psychiatrist on a monthly schedule is being treated without medication        The following portions of the patient's history were reviewed and updated as appropriate: allergies, current medications, past family history, past medical history, past social history, past surgical history and problem list       Review of Systems   Constitutional:        HPI   HENT: Negative  Eyes: Negative  Cardiovascular: Negative  Gastrointestinal: Negative  Endocrine: Negative  Genitourinary: Negative  Musculoskeletal: Negative  Skin: Negative  Allergic/Immunologic: Negative  Neurological: Negative  Hematological: Negative  Psychiatric/Behavioral:        HPI         Data to review:       Objective:    Vitals:    02/24/20 0733   BP: 120/82   BP Location: Left arm   Patient Position: Sitting   Cuff Size: Large   Pulse: 56   Resp: 18   Weight: 108 kg (237 lb)   Height: 6' 2" (1 88 m)        Physical Exam   Constitutional: He is oriented to person, place, and time  He appears well-developed and well-nourished  HENT:   Head: Normocephalic and atraumatic  Right Ear: External ear normal    Left Ear: External ear normal    Nose: Nose normal    Mouth/Throat: Oropharynx is clear and moist  No oropharyngeal exudate  Eyes: Pupils are equal, round, and reactive to light  Conjunctivae and EOM are normal  No scleral icterus  Neck: Neck supple  No JVD present  No tracheal deviation present  No thyromegaly present  Cardiovascular: Normal rate, regular rhythm and normal heart sounds  Pulmonary/Chest: Effort normal and breath sounds normal    Abdominal: Soft  Bowel sounds are normal  He exhibits no distension and no mass  There is no tenderness  There is no rebound and no guarding  No hernia  Musculoskeletal: He exhibits no edema, tenderness or deformity  Lymphadenopathy:     He has no cervical adenopathy  Neurological: He is alert and oriented to person, place, and time  He displays normal reflexes  No cranial nerve deficit or sensory deficit  He exhibits normal muscle tone  Coordination normal    Skin: Skin is warm and dry  Psychiatric: He has a normal mood and affect  BMI Counseling: Body mass index is 30 43 kg/m²   The BMI is above normal  Nutrition recommendations include moderation in carbohydrate intake and reducing intake of cholesterol  Exercise recommendations include exercising 3-5 times per week  BMI Counseling: Body mass index is 30 43 kg/m²  The BMI is above normal  Nutrition recommendations include reducing intake of cholesterol

## 2020-11-27 ENCOUNTER — APPOINTMENT (OUTPATIENT)
Dept: LAB | Facility: CLINIC | Age: 30
End: 2020-11-27
Payer: COMMERCIAL

## 2020-11-27 ENCOUNTER — OFFICE VISIT (OUTPATIENT)
Dept: FAMILY MEDICINE CLINIC | Facility: CLINIC | Age: 30
End: 2020-11-27
Payer: COMMERCIAL

## 2020-11-27 VITALS
HEIGHT: 74 IN | TEMPERATURE: 98.2 F | HEART RATE: 68 BPM | WEIGHT: 237 LBS | SYSTOLIC BLOOD PRESSURE: 108 MMHG | BODY MASS INDEX: 30.42 KG/M2 | DIASTOLIC BLOOD PRESSURE: 78 MMHG | RESPIRATION RATE: 16 BRPM

## 2020-11-27 DIAGNOSIS — R80.9 URINE PROTEIN INCREASED: ICD-10-CM

## 2020-11-27 DIAGNOSIS — Z11.1 SCREENING-PULMONARY TB: ICD-10-CM

## 2020-11-27 DIAGNOSIS — Z11.1 SCREENING FOR TUBERCULOSIS: ICD-10-CM

## 2020-11-27 DIAGNOSIS — E66.9 OBESITY (BMI 30-39.9): ICD-10-CM

## 2020-11-27 DIAGNOSIS — F31.9 BIPOLAR DEPRESSION (HCC): ICD-10-CM

## 2020-11-27 DIAGNOSIS — Z00.00 HEALTH CARE MAINTENANCE: ICD-10-CM

## 2020-11-27 DIAGNOSIS — E78.2 MIXED HYPERLIPIDEMIA: ICD-10-CM

## 2020-11-27 DIAGNOSIS — Z00.00 HEALTH CARE MAINTENANCE: Primary | ICD-10-CM

## 2020-11-27 DIAGNOSIS — E78.5 HYPERLIPIDEMIA, UNSPECIFIED HYPERLIPIDEMIA TYPE: ICD-10-CM

## 2020-11-27 LAB
ALBUMIN SERPL BCP-MCNC: 4.2 G/DL (ref 3.5–5)
ALP SERPL-CCNC: 55 U/L (ref 46–116)
ALT SERPL W P-5'-P-CCNC: 52 U/L (ref 12–78)
ANION GAP SERPL CALCULATED.3IONS-SCNC: 3 MMOL/L (ref 4–13)
AST SERPL W P-5'-P-CCNC: 17 U/L (ref 5–45)
BILIRUB SERPL-MCNC: 0.36 MG/DL (ref 0.2–1)
BILIRUB UR QL STRIP: NEGATIVE
BUN SERPL-MCNC: 10 MG/DL (ref 5–25)
CALCIUM SERPL-MCNC: 9.7 MG/DL (ref 8.3–10.1)
CHLORIDE SERPL-SCNC: 107 MMOL/L (ref 100–108)
CHOLEST SERPL-MCNC: 215 MG/DL (ref 50–200)
CLARITY UR: CLEAR
CO2 SERPL-SCNC: 30 MMOL/L (ref 21–32)
COLOR UR: YELLOW
CREAT SERPL-MCNC: 0.83 MG/DL (ref 0.6–1.3)
ERYTHROCYTE [DISTWIDTH] IN BLOOD BY AUTOMATED COUNT: 12.1 % (ref 11.6–15.1)
GFR SERPL CREATININE-BSD FRML MDRD: 117 ML/MIN/1.73SQ M
GLUCOSE P FAST SERPL-MCNC: 91 MG/DL (ref 65–99)
GLUCOSE UR STRIP-MCNC: NEGATIVE MG/DL
HCT VFR BLD AUTO: 49.5 % (ref 36.5–49.3)
HDLC SERPL-MCNC: 47 MG/DL
HGB BLD-MCNC: 16 G/DL (ref 12–17)
HGB UR QL STRIP.AUTO: NEGATIVE
KETONES UR STRIP-MCNC: NEGATIVE MG/DL
LDLC SERPL CALC-MCNC: 142 MG/DL (ref 0–100)
LEUKOCYTE ESTERASE UR QL STRIP: NEGATIVE
MCH RBC QN AUTO: 30 PG (ref 26.8–34.3)
MCHC RBC AUTO-ENTMCNC: 32.3 G/DL (ref 31.4–37.4)
MCV RBC AUTO: 93 FL (ref 82–98)
NITRITE UR QL STRIP: NEGATIVE
PH UR STRIP.AUTO: 6 [PH]
PLATELET # BLD AUTO: 243 THOUSANDS/UL (ref 149–390)
PMV BLD AUTO: 11.8 FL (ref 8.9–12.7)
POTASSIUM SERPL-SCNC: 4.4 MMOL/L (ref 3.5–5.3)
PROT SERPL-MCNC: 8.1 G/DL (ref 6.4–8.2)
PROT UR STRIP-MCNC: NEGATIVE MG/DL
RBC # BLD AUTO: 5.34 MILLION/UL (ref 3.88–5.62)
SODIUM SERPL-SCNC: 140 MMOL/L (ref 136–145)
SP GR UR STRIP.AUTO: 1.01 (ref 1–1.03)
TRIGL SERPL-MCNC: 128 MG/DL
TSH SERPL DL<=0.05 MIU/L-ACNC: 1.35 UIU/ML (ref 0.36–3.74)
UROBILINOGEN UR QL STRIP.AUTO: 0.2 E.U./DL
WBC # BLD AUTO: 6.07 THOUSAND/UL (ref 4.31–10.16)

## 2020-11-27 PROCEDURE — 1036F TOBACCO NON-USER: CPT | Performed by: FAMILY MEDICINE

## 2020-11-27 PROCEDURE — 3008F BODY MASS INDEX DOCD: CPT | Performed by: FAMILY MEDICINE

## 2020-11-27 PROCEDURE — 80053 COMPREHEN METABOLIC PANEL: CPT

## 2020-11-27 PROCEDURE — 36415 COLL VENOUS BLD VENIPUNCTURE: CPT

## 2020-11-27 PROCEDURE — 81003 URINALYSIS AUTO W/O SCOPE: CPT

## 2020-11-27 PROCEDURE — 99395 PREV VISIT EST AGE 18-39: CPT | Performed by: FAMILY MEDICINE

## 2020-11-27 PROCEDURE — 84443 ASSAY THYROID STIM HORMONE: CPT

## 2020-11-27 PROCEDURE — 85027 COMPLETE CBC AUTOMATED: CPT

## 2020-11-27 PROCEDURE — 86480 TB TEST CELL IMMUN MEASURE: CPT

## 2020-11-27 PROCEDURE — 80061 LIPID PANEL: CPT

## 2020-11-30 LAB
GAMMA INTERFERON BACKGROUND BLD IA-ACNC: 0.01 IU/ML
M TB IFN-G BLD-IMP: NEGATIVE
M TB IFN-G CD4+ BCKGRND COR BLD-ACNC: 0 IU/ML
M TB IFN-G CD4+ BCKGRND COR BLD-ACNC: 0 IU/ML
MITOGEN IGNF BCKGRD COR BLD-ACNC: >10 IU/ML

## 2021-04-16 NOTE — MISCELLANEOUS
Get antibiotics Message   Recorded as Task   Date: 10/26/2017 04:18 PM, Created By: Sammi Llanos   Task Name: Medical Complaint Callback   Assigned To: Jeremy SHAH,TEAM   Regarding Patient: Mal Kennedy, Status: Active   CommentEl Plate - 26 Oct 2017 4:18 PM     TASK CREATED  Caller: Self; [phone] (Home); [phone] (Work)  Pt post vas 10/20/17 feels his testi's overly swollen  [phone]   Rosamaria Mo - 26 Oct 2017 4:33 PM     TASK EDITED  SPOKE TO PT STATES HE HAD SOME BILATERAL SWELLING WHICH DECREASED AFTER APPLYING ICE  IN LAST TWO DAYS SWELLING HAS INCREASED ALONG WITH SOME DISCOLORATION  APPT 10/27 WITH DR Jose Cadena  INSTRUCTED IF SWELLING INCREASES TONIGHT WITH INCREASE OF PAIN TO GO TO ER  Active Problems    1  Bipolar depression (296 50) (F31 30)   2  Hyperlipidemia (272 4) (E78 5)   3  Obesity (BMI 30-39 9) (278 00) (E66 9)   4  Request for sterilization (V49 89) (Z78 9)   5  Weight gain (783 1) (R63 5)    Current Meds   1  Magnesium CAPS;    Therapy: (Recorded:[id number]) to Recorded    Signatures   Electronically signed by : Erin Booth RN; Oct 26 2017  4:34PM EST                       (Author)

## 2021-05-03 ENCOUNTER — OFFICE VISIT (OUTPATIENT)
Dept: URGENT CARE | Facility: MEDICAL CENTER | Age: 31
End: 2021-05-03
Payer: COMMERCIAL

## 2021-05-03 VITALS
TEMPERATURE: 99 F | BODY MASS INDEX: 29.29 KG/M2 | HEART RATE: 92 BPM | HEIGHT: 73 IN | WEIGHT: 221 LBS | OXYGEN SATURATION: 97 %

## 2021-05-03 DIAGNOSIS — Z11.59 SPECIAL SCREENING EXAMINATION FOR UNSPECIFIED VIRAL DISEASE: Primary | ICD-10-CM

## 2021-05-03 PROCEDURE — 99213 OFFICE O/P EST LOW 20 MIN: CPT | Performed by: FAMILY MEDICINE

## 2021-05-03 PROCEDURE — U0005 INFEC AGEN DETEC AMPLI PROBE: HCPCS | Performed by: FAMILY MEDICINE

## 2021-05-03 PROCEDURE — U0003 INFECTIOUS AGENT DETECTION BY NUCLEIC ACID (DNA OR RNA); SEVERE ACUTE RESPIRATORY SYNDROME CORONAVIRUS 2 (SARS-COV-2) (CORONAVIRUS DISEASE [COVID-19]), AMPLIFIED PROBE TECHNIQUE, MAKING USE OF HIGH THROUGHPUT TECHNOLOGIES AS DESCRIBED BY CMS-2020-01-R: HCPCS | Performed by: FAMILY MEDICINE

## 2021-05-04 LAB — SARS-COV-2 RNA RESP QL NAA+PROBE: NEGATIVE

## 2021-05-04 NOTE — PROGRESS NOTES
[facility]        NAME: Gudelia Shaw is a 31 y o  male  : 1990    MRN: [id number]  DATE: May 3, 2021  TIME: 8:24 PM    Assessment and Plan   Special screening examination for unspecified viral disease [Z11 59]  1  Special screening examination for unspecified viral disease  Novel Coronavirus (Covid-19),[facility]         Patient Instructions       Follow up with PCP in 3-5 days  Proceed to  ER if symptoms worsen  Chief Complaint     Chief Complaint   Patient presents with    COVID-19     laurent was exposed to a co-worker who tested positive  he denies any symptoms         History of Present Illness        50-year-old male here today to be tested for COVID-19  Patient informs me he has been in close contact with a co-worker who was been ill since last week  The co-worker was sent home today because he was quite ill  However, the patient found out that the co-worker when to a urgent care and was tested for COVID-19 and had a rapid test that was positive  At the present time, the patient denies any fever  Denies loss of smell or loss of taste  Denies nausea or vomiting or diarrhea  Denies cough or shortness of breath or chest pain  Review of Systems   Review of Systems   Constitutional: Negative  HENT: Negative  Respiratory: Negative  Gastrointestinal: Negative  Neurological: Negative  Current Medications       Current Outpatient Medications:     Magnesium Gluconate (MAGNESIUM 27 PO), Take by mouth as needed, Disp: , Rfl:     Current Allergies     Allergies as of 2021 - Reviewed 2021   Allergen Reaction Noted    Penicillins Hives 2015            The following portions of the patient's history were reviewed and updated as appropriate: allergies, current medications, past family history, past medical history, past social history, past surgical history and problem list      History reviewed   No pertinent past medical history  Past Surgical History:   Procedure Laterality Date    SHOULDER SURGERY         Family History   Problem Relation Age of Onset    Bipolar disorder Mother          Medications have been verified  Objective   Pulse 92   Temp 99 °F (37 2 °C)   Ht 6' 1" (1 854 m)   Wt 100 kg (221 lb)   SpO2 97%   BMI 29 16 kg/m²   No LMP for male patient  Physical Exam     Physical Exam  Nursing note reviewed  Constitutional:       Appearance: Normal appearance  HENT:      Nose:      Comments: Erythematous hypertrophic turbinates  Mouth/Throat:      Mouth: Mucous membranes are moist    Neck:      Musculoskeletal: Normal range of motion and neck supple  Cardiovascular:      Rate and Rhythm: Normal rate and regular rhythm  Pulmonary:      Effort: Pulmonary effort is normal       Breath sounds: Normal breath sounds  Neurological:      Mental Status: He is alert

## 2021-05-04 NOTE — PATIENT INSTRUCTIONS
Patient asymptomatic  COVID-19 test performed  Patient advised to remain in quarantine until test results are back  He expressed understanding  COVID-19 (Coronavirus Disease 2019)   WHAT YOU NEED TO KNOW:   COVID-19 is the disease caused by the novel (new) coronavirus first discovered in December 2019  Coronaviruses generally cause upper respiratory (nose, throat, and lung) infections, such as a cold  The new virus can also cause serious lower respiratory conditions, such as pneumonia or acute respiratory distress syndrome (ARDS)  Anyone can develop serious problems from the new virus, but your risk is higher if you are 65 or older  A weak immune system, diabetes, or a heart or lung condition can also increase your risk  DISCHARGE INSTRUCTIONS:   If you think you or someone you know may be infected:  Do the following to protect others:  · If emergency care is needed,  tell the  about the possible infection, or call ahead and tell the emergency department  · Call a healthcare provider  for instructions if symptoms are mild  Anyone who may be infected should not  arrive without calling first  The provider will need to protect staff members and other patients  · The person who may be infected needs to wear a face covering  while getting medical care  This will help lower the risk of infecting others  Coverings are not used for anyone who is younger than 2 years, has breathing problems, or cannot remove it  The provider can give you instructions for anyone who cannot wear a covering  Call your local emergency number (911 in the [address] [place] [address],[address]) or go to the emergency department if:   · You have trouble breathing or shortness of breath at rest     · You have chest pain or pressure that lasts longer than 5 minutes  · You become confused or hard to wake  · Your lips or face are blue  · You have a fever of 104°F (40°C) or higher      Call your doctor if:   · You do not  have symptoms of COVID-19 but had close physical contact within 14 days with someone who tested positive  · You have questions or concerns about your condition or care  Medicines: You may need any of the following for mild symptoms:  · Decongestants  help reduce nasal congestion and help you breathe more easily  If you take decongestant pills, they may make you feel restless or cause problems with your sleep  Do not use decongestant sprays for more than a few days  · Cough suppressants  help reduce coughing  Ask your healthcare provider which type of cough medicine is best for you  · Acetaminophen  decreases pain and fever  It is available without a doctor's order  Ask how much to take and how often to take it  Follow directions  Read the labels of all other medicines you are using to see if they also contain acetaminophen, or ask your doctor or pharmacist  Acetaminophen can cause liver damage if not taken correctly  Do not use more than 4 grams (4,000 milligrams) total of acetaminophen in one day  · NSAIDs , such as ibuprofen, help decrease swelling, pain, and fever  NSAIDs can cause stomach bleeding or kidney problems in certain people  If you take blood thinner medicine, always ask your healthcare provider if NSAIDs are safe for you  Always read the medicine label and follow directions  · Take your medicine as directed  Contact your healthcare provider if you think your medicine is not helping or if you have side effects  Tell him or her if you are allergic to any medicine  Keep a list of the medicines, vitamins, and herbs you take  Include the amounts, and when and why you take them  Bring the list or the pill bottles to follow-up visits  Carry your medicine list with you in case of an emergency  How the 2019 coronavirus spreads: The virus spreads quickly and easily  You can become infected if you are in contact with a large amount of the virus, even for a short time   You can also become infected by being around a small amount of virus for a long time  The following are ways the virus is thought to spread, but more information may be coming:  · Droplets are the most common way all coronaviruses spread  The virus can travel in droplets that form when a person talks, coughs, or sneezes  Anyone who breathes in the droplets or gets them in his or her eyes can become infected with the virus  Close personal contact with an infected person is thought to be the main way the virus spreads  Close personal contact means you are within 6 feet (2 meters) of the person  · Person-to-person contact can spread the virus  For example, a person with the virus on his or her hands can spread it by shaking hands with someone  At this time, it does not appear that the virus can be passed to a baby during pregnancy or delivery  The baby can be infected after he or she is born through person-to-person contact  The virus also does not appear to spread in breast milk  If you are pregnant or breastfeeding, talk to your healthcare provider or obstetrician about any concerns you have  · The virus can stay on objects and surfaces  A person can get the virus on his or her hands by touching the object or surface  Infection happens if the person then touches his or her eyes or mouth with unwashed hands  It is not yet known how long the virus can stay on an object or surface  That is why it is important to clean all surfaces that are used regularly  · An infected animal may be able to infect a person who touches it  This may happen at live markets or on a farm  How everyone can lower the risk for COVID-19:  The best way to prevent infection is to avoid anyone who is infected, but this can be hard to do  An infected person can spread the virus before signs or symptoms begin, or even if signs or symptoms never develop  The following can help lower the risk for infection:      · Wash your hands often throughout the day  Use soap and water   Rub your soapy hands together, lacing your fingers  Wash the front and back of each hand, and in between your fingers  Use the fingers of one hand to scrub under the fingernails of the other hand  Wash for at least 20 seconds  Rinse with warm, running water for several seconds  Then dry your hands with a clean towel or paper towel  Use hand  that contains alcohol if soap and water are not available  Do not touch your eyes, nose, or mouth without washing your hands first  Teach children how to wash their hands and use hand   · Cover a sneeze or cough  This prevents droplets from traveling from you to others  Turn your face away and cover your mouth and nose with a tissue  Throw the tissue away  Use the bend of your arm if a tissue is not available  Then wash your hands well with soap and water or use hand   Turn and cover your face if you are around someone who is sneezing or coughing  Teach children how to cover a cough or sneeze  · Follow worldwide, national, and local social distancing guidelines  Social distancing means people avoid close physical contact so the virus cannot spread from one person to another  Keep at least 6 feet (2 meters) between you and others  Also keep this distance from anyone who comes to your home, such as someone making a delivery  · Make a habit of not touching your face  It is not known how long the virus can stay on objects and surfaces  If you get the virus on your hands, you can transfer it to your eyes, nose, or mouth and become infected  You can also transfer it to objects, surfaces, or people  Be aware of what you touch when you go out  Examples include handrails and elevator buttons  Try not to touch anything with bare hands unless it is necessary  Wash your hands before you leave your home and when you return  · Clean and disinfect high-touch surfaces and objects often  Use a disinfecting solution or wipes   You can make a solution by diluting 4 teaspoons of bleach in 1 quart (4 cups) of water  Clean and disinfect even if you think no one living in or coming to your home is infected with the virus  You can wipe items with a disinfecting cloth before you bring them into your home  Wash your hands after you handle anything you bring into your home  · Make your immune system as healthy as possible  A weakened immune system makes you more vulnerable to the new coronavirus  No COVID-19 vaccine is available yet  Vaccines such as the flu and pneumonia vaccines can help your immune system  Your healthcare provider can tell you which vaccines to get, and when to get them  Keep your immune system as strong as possible  Do not smoke  Eat healthy foods, exercise regularly, and try to manage stress  Go to bed and wake up at the same times each day  Social distancing guidelines:  National and local social distancing rules vary  Rules may change over time as restrictions are lifted  Restrictions may return if an outbreak happens where you live  It is important to know and follow all current social distancing rules in your area  The following are general guidelines:  · Limit trips out of your home  You may be able to have food, medicines, and other supplies delivered  If possible, have delivered items left at your door or other area  Try not to have someone hand you an item  You will be so close to the person that the virus can spread between you  · Do not have close physical contact with anyone who does not live in your home  Do not shake hands with, hug, or kiss a person as a greeting  Stand or walk as far from others as possible  If you must use public transportation (such as a bus or subway), try to sit or stand away from others  You can stay safely connected with others through phone calls, e-mail messages, social media websites, and video chats  Check in on anyone who may be having a hard time socially distancing, or who lives alone   Ask administrators at nursing homes or long-term care facilities how you can safely communicate with someone living there  · Wear a cloth face covering around others who do not live in your home  Face coverings help prevent the virus from spreading to others in droplets  You can use a clear face covering if someone needs to read your lips  This is a cloth covering that has plastic over the mouth area so your lips can be seen  Do not use coverings that have breathing valves or vents  The virus can travel out of the valve or vent and be spread to others  Do not take your covering off to talk, cough, or sneeze  Do not use coverings on children younger than 2 years or on anyone who has breathing problems or cannot remove it  · Only allow medical or other necessary professionals into your home  Wear your face covering, and remind professionals to wear a face covering  Remind them to wash their hands when they arrive and before they leave  Do not  let anyone who does not live in your home in, even if the person is not sick  A person can pass the virus to others before symptoms of COVID-19 begin  Some people never even develop symptoms  Children commonly have mild symptoms or no symptoms  It may be hard to tell a child not to hug or kiss you  Explain that this is how he or she can help you stay healthy  · Do not go to someone else's home unless it is necessary  Do not go over to visit, even if the person is lonely  Only go if you need to help him or her  Make sure you both wear face coverings while you are there  · Avoid large gatherings and crowds  Gatherings or crowds of 10 or more individuals can cause the virus to spread  Examples of gatherings include parties, sporting events, [employer] services, and conferences  Crowds may form at beaches, koroma, and tourist attractions  Protect yourself by staying away from large gatherings and crowds      · Ask your healthcare provider for other ways to have appointments  You may be able to have appointments without having to go into the provider's office  Some providers offer phone, video, or other types of appointments  You may also be able to get prescriptions for a few months of your medicines at a time  · Stay safe if you must go out to work  You may have a job that can only be done outside your home  Keep physical distance between you and other workers as much as possible  Follow your employer's rules so everyone stays safe  If you have COVID-19 and are recovering at home:  Healthcare providers will give you specific instructions to follow  The following are general guidelines to remind you how to keep others safe until you are well:  · Wash your hands often  Use soap and water as much as possible  You can use hand  that contains alcohol if soap and water are not available  Do not share towels with anyone  If you use paper towels, throw them away in a lined trash can kept in your room or area  Use a covered trash can, if possible  · Do not go out of your home unless it is necessary  You may have to go to your healthcare provider's office for check-ups or to get prescription refills  Do not arrive at the provider's office without an appointment  Providers have to make their offices safe for staff and other patients  · Do not have close physical contact with anyone unless it is necessary  Only have close physical contact with a person giving direct care, or a baby or child you must care for  Family members and friends should not visit you  If possible, stay in a separate area or room of your home if you live with others  No one should go into the area or room except to give you care  You can visit with others by phone, video chat, e-mail, or similar systems  It is important to stay connected with others in your life while you recover  · Wear a face covering while others are near you    This can help prevent droplets from spreading the virus when you talk, sneeze, or cough  Put the covering on before anyone comes into your room or area  Remind the person to cover his or her nose and mouth before going in to provide care for you  · Do not share items  Do not share dishes, towels, or other items with anyone  Items need to be washed after you use them  · Protect your baby  Wash your hands with soap and water often throughout the day  Wear a clean face covering while you breastfeed, or while you express or pump breast milk  If possible, ask someone who is well to care for your baby  You can put breast milk in bottles for the person to use, if needed  Talk to your healthcare provider if you have any questions or concerns about caring for or bonding with your baby  He or she will tell you when to bring your baby in for check-ups and vaccines  He or she will also tell you what to do if you think your baby was infected with the new virus  · Do not handle live animals  Until more is known, it is best not to touch, play with, or handle live animals  Some animals, including pets, have been infected with the new coronavirus  Do not handle or care for animals until you are well  Care includes feeding, petting, and cuddling your pet  Do not let your pet lick you or share your food  Ask someone who is not infected to take care of your pet, if possible  If you must care for a pet, wear a face covering  Wash your hands before and after you give care  · Follow directions from your healthcare provider for being around others after you recover  You will need to wait at least 10 days after symptoms first appeared  Then you will need to have no fever for 24 hours without fever medicine, and no other symptoms  A loss of taste or smell may continue for several months  It is considered okay to be around others if this is your only symptom  It is not known for sure if or for how long a recovered person can pass the virus to others   Your provider may give you instructions, such as continuing social distancing or wearing a face covering around others  How to take care of someone who has COVID-19:  If the person lives in another home, arrange for a time to give care  Remember to bring a few pairs of disposable gloves and a cloth face covering  The following are general guidelines to help you safely care for anyone who has COVID-19:  · Wash your hands often  Wash before and after you go into the person's home, area, or room  Throw paper towels away in a lined trash can that has a lid, if possible  · Do not allow others to go near the person  No one should come into the person's home unless it is necessary  If possible, the person should be in a separate area or room if he or she lives with others  Keep the room's door shut unless you need to go in or out  Have others call, video chat, or e-mail the person if he or she is feeling well enough  The person may feel lonely if he or she is kept separate for a long period of time  Safe communication can help him or her stay connected to family and friends  · Make sure the person's room has good air flow  You may be able to open the window if the weather allows  An air conditioner can also be turned on to help air move  · Contact the person before you go in to give care  Make sure the person is wearing a face covering  Remind him or her to wash his or her hands with soap and water  He or she can use hand  that contains alcohol if soap and water are not available  Put on a face covering before you go in to give care  · Wear gloves while you give care and clean  Clean items the person uses often  Clean countertops, cooking surfaces, and the fronts and insides of the microwave and refrigerator  Clean the shower, toilet, the area around the toilet, the sink, the area around the sink, and faucets  Gather used laundry or bedding  Wash and dry items on the warmest settings the fabric allows   Wash dishes and silverware in hot, soapy water or in a   · Anything you throw away needs to go into a lined trash can  When you need to empty the trash, close the open end of the lining and tie it closed  This helps prevent items the virus is on from spilling out of the trash  Remove your gloves and throw them away  Wash your hands  Follow up with your doctor as directed:  Write down your questions so you remember to ask them during your visits  For more information:   · Centers for Disease Control and Prevention  [address] , [address]  Phone: 8- 170 - 982-5629  Web Address: [employer] com br    © Copyright [address] Information is for End User's use only and may not be sold, redistributed or otherwise used for commercial purposes  All illustrations and images included in CareNotes® are the copyrighted property of [employer]  or [address] [place], [place] [address]leela manuela   The above information is an  only  It is not intended as medical advice for individual conditions or treatments  Talk to your doctor, nurse or pharmacist before following any medical regimen to see if it is safe and effective for you

## 2021-05-13 ENCOUNTER — OFFICE VISIT (OUTPATIENT)
Dept: URGENT CARE | Facility: CLINIC | Age: 31
End: 2021-05-13
Payer: COMMERCIAL

## 2021-05-13 VITALS — HEART RATE: 92 BPM | TEMPERATURE: 99.1 F | RESPIRATION RATE: 18 BRPM | OXYGEN SATURATION: 97 %

## 2021-05-13 DIAGNOSIS — Z11.59 SPECIAL SCREENING EXAMINATION FOR UNSPECIFIED VIRAL DISEASE: Primary | ICD-10-CM

## 2021-05-13 PROCEDURE — U0005 INFEC AGEN DETEC AMPLI PROBE: HCPCS | Performed by: NURSE PRACTITIONER

## 2021-05-13 PROCEDURE — 99213 OFFICE O/P EST LOW 20 MIN: CPT | Performed by: NURSE PRACTITIONER

## 2021-05-13 PROCEDURE — U0003 INFECTIOUS AGENT DETECTION BY NUCLEIC ACID (DNA OR RNA); SEVERE ACUTE RESPIRATORY SYNDROME CORONAVIRUS 2 (SARS-COV-2) (CORONAVIRUS DISEASE [COVID-19]), AMPLIFIED PROBE TECHNIQUE, MAKING USE OF HIGH THROUGHPUT TECHNOLOGIES AS DESCRIBED BY CMS-2020-01-R: HCPCS | Performed by: NURSE PRACTITIONER

## 2021-05-13 NOTE — PATIENT INSTRUCTIONS
Patient instructed to self quarantine  Advised to avoid nsaids  If you develop prolonged high fever, worsening or productive cough, shortness of breath, difficulty breathing, chest pain, or any new or concerning symptoms please proceed ER  Instructed patient to call ER prior to arrival make them aware she is being test for covid  Patient verbalizes understanding  Start vitamin c 1g twice daily,vitamin d3 2000IU daily, and multivitamin  monitor pulse ox  Call PCP in 24 hours for f/u  [address]    Your healthcare provider and/or public health staff have evaluated you and have determined that you do not need to remain in the hospital at this time  At this time you can be isolated at home where you will be monitored by staff from your local or state health department  You should carefully follow the prevention and isolation steps below until a healthcare provider or local or state health department says that you can return to your normal activities  Stay home except to get medical care    People who are mildly ill with COVID-19 are able to isolate at home during their illness  You should restrict activities outside your home, except for getting medical care  Do not go to work, school, or public areas  Avoid using public transportation, ride-sharing, or taxis  Separate yourself from other people and animals in your home    People: As much as possible, you should stay in a specific room and away from other people in your home  Also, you should use a separate bathroom, if available  Animals: You should restrict contact with pets and other animals while you are sick with COVID-19, just like you would around other people  Although there have not been reports of pets or other animals becoming sick with COVID-19, it is still recommended that people sick with COVID-19 limit contact with animals until more information is known about the virus   When possible, have another member of your household care for your animals while you are sick  If you are sick with COVID-19, avoid contact with your pet, including petting, snuggling, being kissed or licked, and sharing food  If you must care for your pet or be around animals while you are sick, wash your hands before and after you interact with pets and wear a facemask  See COVID-19 and Animals for more information  Call ahead before visiting your doctor    If you have a medical appointment, call the healthcare provider and tell them that you have or may have COVID-19  This will help the healthcare providers office take steps to keep other people from getting infected or exposed  Wear a facemask    You should wear a facemask when you are around other people (e g , sharing a room or vehicle) or pets and before you enter a healthcare providers office  If you are not able to wear a facemask (for example, because it causes trouble breathing), then people who live with you should not stay in the same room with you, or they should wear a facemask if they enter your room  Cover your coughs and sneezes    Cover your mouth and nose with a tissue when you cough or sneeze  Throw used tissues in a lined trash can  Immediately wash your hands with soap and water for at least 20 seconds or, if soap and water are not available, clean your hands with an alcohol-based hand  that contains at least 60% alcohol  Clean your hands often    Wash your hands often with soap and water for at least 20 seconds, especially after blowing your nose, coughing, or sneezing; going to the bathroom; and before eating or preparing food  If soap and water are not readily available, use an alcohol-based hand  with at least 60% alcohol, covering all surfaces of your hands and rubbing them together until they feel dry  Soap and water are the best option if hands are visibly dirty  Avoid touching your eyes, nose, and mouth with unwashed hands      Avoid sharing personal household items    You should not share dishes, drinking glasses, cups, eating utensils, towels, or bedding with other people or pets in your home  After using these items, they should be washed thoroughly with soap and water  Clean all high-touch surfaces everyday    High touch surfaces include counters, tabletops, doorknobs, bathroom fixtures, toilets, phones, keyboards, tablets, and bedside tables  Also, clean any surfaces that may have blood, stool, or body fluids on them  Use a household cleaning spray or wipe, according to the label instructions  Labels contain instructions for safe and effective use of the cleaning product including precautions you should take when applying the product, such as wearing gloves and making sure you have good ventilation during use of the product  Monitor your symptoms    Seek prompt medical attention if your illness is worsening (e g , difficulty breathing)  Before seeking care, call your healthcare provider and tell them that you have, or are being evaluated for, COVID-19  Put on a facemask before you enter the facility  These steps will help the healthcare providers office to keep other people in the office or waiting room from getting infected or exposed  Ask your healthcare provider to call the local or [facility] health department  Persons who are placed under active monitoring or facilitated self-monitoring should follow instructions provided by their local health department or occupational health professionals, as appropriate  If you have a medical emergency and need to call 911, notify the dispatch personnel that you have, or are being evaluated for COVID-19  If possible, put on a facemask before emergency medical services arrive      Discontinuing home isolation    Patients with confirmed COVID-19 should remain under home isolation precautions until the following conditions are met:   - They have had no fever for at least 24 hours (that is one full day of no fever without the use medicine that reduces fevers)  AND  - other symptoms have improved (for example, when their cough or shortness of breath have improved)  AND  - If had mild or moderate illness, at least 10 days have passed since their symptoms first appeared or if severe illness (needed oxygen) or immunosuppressed, at least 20 days have passed since symptoms first appeared  Patients with confirmed COVID-19 should also notify close contacts (including their workplace) and ask that they self-quarantine  Currently, close contact is defined as being within 6 feet for 15 minutes or more from the period 24 hours starting 48 hours before symptom onset to the time at which the patient went into isolation  Close contacts of patients diagnosed with COVID-19 should be instructed by the patient to self-quarantine for 14 days from the last time of their last contact with the patient       Source: [employer]

## 2021-05-14 NOTE — PROGRESS NOTES
[facility]        NAME: Lashanda Ybarra is a 31 y o  male  : 1990    MRN: [id number]  DATE: May 14, 2021  TIME: 10:26 AM    Assessment and Plan   Special screening examination for unspecified viral disease [Z11 59]  1  Special screening examination for unspecified viral disease  Novel Coron[facility]Covid-19),[facility]         Patient Instructions     Patient Instructions   Patient instructed to self quarantine  Advised to avoid nsaids  If you develop prolonged high fever, worsening or productive cough, shortness of breath, difficulty breathing, chest pain, or any new or concerning symptoms please proceed ER  Instructed patient to call ER prior to arrival make them aware she is being test for covid  Patient verbalizes understanding  Start vitamin c 1g twice daily,vitamin d3 2000IU daily, and multivitamin  monitor pulse ox  Call PCP in 24 hours for f/u  [address]    Your healthcare provider and/or public health staff have evaluated you and have determined that you do not need to remain in the hospital at this time  At this time you can be isolated at home where you will be monitored by staff from your local or state health department  You should carefully follow the prevention and isolation steps below until a healthcare provider or local or state health department says that you can return to your normal activities  Stay home except to get medical care    People who are mildly ill with COVID-19 are able to isolate at home during their illness  You should restrict activities outside your home, except for getting medical care  Do not go to work, school, or public areas  Avoid using public transportation, ride-sharing, or taxis  Separate yourself from other people and animals in your home    People: As much as possible, you should stay in a specific room and away from other people in your home   Also, you should use a separate bathroom, if available  Animals: You should restrict contact with pets and other animals while you are sick with COVID-19, just like you would around other people  Although there have not been reports of pets or other animals becoming sick with COVID-19, it is still recommended that people sick with COVID-19 limit contact with animals until more information is known about the virus  When possible, have another member of your household care for your animals while you are sick  If you are sick with COVID-19, avoid contact with your pet, including petting, snuggling, being kissed or licked, and sharing food  If you must care for your pet or be around animals while you are sick, wash your hands before and after you interact with pets and wear a facemask  See COVID-19 and Animals for more information  Call ahead before visiting your doctor    If you have a medical appointment, call the healthcare provider and tell them that you have or may have COVID-19  This will help the healthcare providers office take steps to keep other people from getting infected or exposed  Wear a facemask    You should wear a facemask when you are around other people (e g , sharing a room or vehicle) or pets and before you enter a healthcare providers office  If you are not able to wear a facemask (for example, because it causes trouble breathing), then people who live with you should not stay in the same room with you, or they should wear a facemask if they enter your room  Cover your coughs and sneezes    Cover your mouth and nose with a tissue when you cough or sneeze  Throw used tissues in a lined trash can  Immediately wash your hands with soap and water for at least 20 seconds or, if soap and water are not available, clean your hands with an alcohol-based hand  that contains at least 60% alcohol      Clean your hands often    Wash your hands often with soap and water for at least 20 seconds, especially after blowing your nose, coughing, or sneezing; going to the bathroom; and before eating or preparing food  If soap and water are not readily available, use an alcohol-based hand  with at least 60% alcohol, covering all surfaces of your hands and rubbing them together until they feel dry  Soap and water are the best option if hands are visibly dirty  Avoid touching your eyes, nose, and mouth with unwashed hands  Avoid sharing personal household items    You should not share dishes, drinking glasses, cups, eating utensils, towels, or bedding with other people or pets in your home  After using these items, they should be washed thoroughly with soap and water  Clean all high-touch surfaces everyday    High touch surfaces include counters, tabletops, doorknobs, bathroom fixtures, toilets, phones, keyboards, tablets, and bedside tables  Also, clean any surfaces that may have blood, stool, or body fluids on them  Use a household cleaning spray or wipe, according to the label instructions  Labels contain instructions for safe and effective use of the cleaning product including precautions you should take when applying the product, such as wearing gloves and making sure you have good ventilation during use of the product  Monitor your symptoms    Seek prompt medical attention if your illness is worsening (e g , difficulty breathing)  Before seeking care, call your healthcare provider and tell them that you have, or are being evaluated for, COVID-19  Put on a facemask before you enter the facility  These steps will help the healthcare providers office to keep other people in the office or waiting room from getting infected or exposed  Ask your healthcare provider to call the local or state health department  Persons who are placed under active monitoring or facilitated self-monitoring should follow instructions provided by their local health department or occupational health professionals, as appropriate    If you have a medical emergency and need to call 911, notify the dispatch personnel that you have, or are being evaluated for COVID-19  If possible, put on a facemask before emergency medical services arrive  Discontinuing home isolation    Patients with confirmed COVID-19 should remain under home isolation precautions until the following conditions are met:   - They have had no fever for at least 24 hours (that is one full day of no fever without the use medicine that reduces fevers)  AND  - other symptoms have improved (for example, when their cough or shortness of breath have improved)  AND  - If had mild or moderate illness, at least 10 days have passed since their symptoms first appeared or if severe illness (needed oxygen) or immunosuppressed, at least 20 days have passed since symptoms first appeared  Patients with confirmed COVID-19 should also notify close contacts (including their workplace) and ask that they self-quarantine  Currently, close contact is defined as being within 6 feet for 15 minutes or more from the period 24 hours starting 48 hours before symptom onset to the time at which the patient went into isolation  Close contacts of patients diagnosed with COVID-19 should be instructed by the patient to self-quarantine for 14 days from the last time of their last contact with the patient  Source: [employer]        Follow up with PCP in 3-5 days  Proceed to  ER if symptoms worsen  Chief Complaint     Chief Complaint   Patient presents with    COVID-19     Patient reports being exposure to Covid 19 on 5/3/2021  Patient c/o loss of taste, loss of smell, cough, congestion, and stuffy nose prior to exposure  Patient tested negative for Covid-19 on 5/4/2021  History of Present Illness        Patient is a 68-year-old male presents with a one-week history of loss of taste and smell, congestion, and cough    Patient states he was exposed to COVID-19 approximately 10 days ago  Patient states he was tested 9 days ago and did test negative  Patient denies any fever, chills, or body aches  Denies any sinus pain, earache, or sore throat  Denies any chest pain, shortness of breath, or palpitations  Has been taking over-the-counter cough and cold medication with some relief  Review of Systems   Review of Systems   Constitutional: Negative for chills, diaphoresis, fatigue and fever  HENT: Positive for congestion and rhinorrhea  Negative for ear discharge, ear pain, facial swelling, mouth sores, postnasal drip, sinus pressure, sinus pain, sore throat and trouble swallowing  Eyes: Negative for photophobia and visual disturbance  Respiratory: Positive for cough  Negative for chest tightness, shortness of breath, wheezing and stridor  Cardiovascular: Negative for chest pain and palpitations  Gastrointestinal: Negative for abdominal pain, diarrhea, nausea and vomiting  Musculoskeletal: Negative for arthralgias, back pain, joint swelling, myalgias, neck pain and neck stiffness  Skin: Negative for rash  Neurological: Negative for dizziness, facial asymmetry, weakness, light-headedness, numbness and headaches  Current Medications       Current Outpatient Medications:     Magnesium Gluconate (MAGNESIUM 27 PO), Take by mouth as needed, Disp: , Rfl:     Current Allergies     Allergies as of 05/13/2021 - Reviewed 05/13/2021   Allergen Reaction Noted    Penicillins Hives 04/23/2015            The following portions of the patient's history were reviewed and updated as appropriate: allergies, current medications, past family history, past medical history, past social history, past surgical history and problem list      History reviewed  No pertinent past medical history      Past Surgical History:   Procedure Laterality Date    SHOULDER SURGERY         Family History   Problem Relation Age of Onset    Bipolar disorder Mother Medications have been verified  Objective   Pulse 92   Temp 99 1 °F (37 3 °C) (Tympanic)   Resp 18   SpO2 97%   No LMP for male patient  Physical Exam     Physical Exam  Constitutional:       General: He is not in acute distress  Appearance: He is well-developed  HENT:      Head: Normocephalic and atraumatic  Right Ear: Hearing, tympanic membrane, ear canal and external ear normal       Left Ear: Hearing, tympanic membrane, ear canal and external ear normal       Nose: Rhinorrhea present  No mucosal edema  Right Sinus: No maxillary sinus tenderness or frontal sinus tenderness  Left Sinus: No maxillary sinus tenderness or frontal sinus tenderness  Mouth/Throat:      Mouth: Mucous membranes are moist       Pharynx: Oropharynx is clear  Uvula midline  No oropharyngeal exudate or posterior oropharyngeal erythema  Tonsils: No tonsillar exudate or tonsillar abscesses  1+ on the right  1+ on the left  Cardiovascular:      Rate and Rhythm: Normal rate and regular rhythm  Heart sounds: Normal heart sounds, S1 normal and S2 normal    Pulmonary:      Effort: Pulmonary effort is normal       Breath sounds: Normal breath sounds and air entry  Lymphadenopathy:      Cervical: No cervical adenopathy  Skin:     General: Skin is warm and dry  Capillary Refill: Capillary refill takes less than 2 seconds  Neurological:      Mental Status: He is alert and oriented to person, place, and time

## 2021-05-15 LAB — SARS-COV-2 RNA RESP QL NAA+PROBE: POSITIVE

## 2021-05-17 ENCOUNTER — TELEPHONE (OUTPATIENT)
Dept: URGENT CARE | Facility: CLINIC | Age: 31
End: 2021-05-17

## 2021-05-17 NOTE — TELEPHONE ENCOUNTER
Attempted to call patient with positive COVID-19 test results  No answer  Left message to return the call    Patient did review positive COVID-19 testing on [facility]

## 2021-05-18 ENCOUNTER — TELEPHONE (OUTPATIENT)
Dept: URGENT CARE | Facility: CLINIC | Age: 31
End: 2021-05-18

## 2021-05-18 ENCOUNTER — TELEMEDICINE (OUTPATIENT)
Dept: FAMILY MEDICINE CLINIC | Facility: CLINIC | Age: 31
End: 2021-05-18
Payer: COMMERCIAL

## 2021-05-18 DIAGNOSIS — U07.1 COVID-19: Primary | ICD-10-CM

## 2021-05-18 PROCEDURE — 99213 OFFICE O/P EST LOW 20 MIN: CPT | Performed by: PHYSICIAN ASSISTANT

## 2021-05-18 PROCEDURE — 1036F TOBACCO NON-USER: CPT | Performed by: PHYSICIAN ASSISTANT

## 2021-05-18 NOTE — PROGRESS NOTES
COVID-19 Outpatient Progress Note    Assessment/Plan:    Problem List Items Addressed This Visit        Other    COVID-19 - Primary         Disposition:     Assessment/plan:  1  COVID-19 infection-patient does seem to be improving significantly  He did have symptoms quite some time prior to testing on the 13th of May but since we are unsure the definitive date we will use the testing date as the beginning over are 10 day quarantine  He may return to work on the 24th of May without restriction  He will contact our office if symptoms would return or worsen this week  I have spent 15 minutes directly with the patient  Encounter provider Jessie Greenberg PA-C    Provider located at [address] [place] [address] PRIMARY CARE  [facility] [address]    Recent Visits  No visits were found meeting these conditions  Showing recent visits within past 7 days and meeting all other requirements     Today's Visits  Date Type Provider Dept   05/18/21 Telemedicine Jessie Greenberg PA-C [facility]   Showing today's visits and meeting all other requirements     Future Appointments  No visits were found meeting these conditions  Showing future appointments within next 150 days and meeting all other requirements          Subjective:   Abena Parmar is a 31 y o  male who has been screened for COVID-19  Patient's symptoms include fatigue, malaise, nasal congestion, rhinorrhea, anosmia, loss of taste and cough  Patient denies fever, chills, shortness of breath, chest tightness, abdominal pain, nausea, vomiting, diarrhea and headaches  Date of positive COVID-19 PCR: 5/13/2021    HPI:  This is a 76-year-old gentleman that presents via virtual video visit using [employer] platform  He is seen for follow-up to COVID positive test results from the 5/13/2021  He was exposed to a co-worker that was positive on the 3rd of May and had a test on the 4th of May which was negative    From that point on he started to develop a bit of a cough and the cough progressively got worse and he decided to get retested on the 13th of May when it was positive  He does feel that he is over the worst of it now  At the height his symptoms included loss of smell, taste, significant fatigue, malaise, nasal congestion, and cough  He does feel most of the symptoms are improved but he still has a bit of cough in the morning  A few of his family members have tested positive since as well  Lab Results   Component Value Date    SARSCOV2 Positive (A) 05/13/2021     No past medical history on file  Past Surgical History:   Procedure Laterality Date    SHOULDER SURGERY       Current Outpatient Medications   Medication Sig Dispense Refill    Magnesium Gluconate (MAGNESIUM 27 PO) Take by mouth as needed       No current facility-administered medications for this visit  Allergies   Allergen Reactions    Penicillins Hives       Review of Systems   Constitutional: Positive for fatigue  Negative for chills and fever  HENT: Positive for congestion and rhinorrhea  Respiratory: Positive for cough  Negative for chest tightness and shortness of breath  Gastrointestinal: Negative for abdominal pain, diarrhea, nausea and vomiting  Neurological: Negative for headaches  Objective: There were no vitals filed for this visit  Physical Exam  Constitutional:       General: He is not in acute distress  Appearance: He is well-developed  HENT:      Head: Normocephalic and atraumatic  Eyes:      Conjunctiva/sclera: Conjunctivae normal    Neck:      Musculoskeletal: Normal range of motion  Pulmonary:      Effort: Pulmonary effort is normal    Skin:     Findings: No rash  Neurological:      Mental Status: He is alert and oriented to person, place, and time  VIRTUAL VISIT DISCLAIMER    Liset Blanc acknowledges that he has consented to an online visit or consultation   He understands that the online visit is based solely on information provided by him, and that, in the absence of a face-to-face physical evaluation by the physician, the diagnosis he receives is both limited and provisional in terms of accuracy and completeness  This is not intended to replace a full medical face-to-face evaluation by the physician  Zechariah Friday understands and accepts these terms

## 2021-05-18 NOTE — LETTER
May 18, 2021     Patient: Genny Simms   YOB: 1990   Date of Visit: 5/18/2021       To Whom it May Concern:    Kathia Beckwith is under my professional care  He was seen via virtual video visit on 5/18/2021  He is improving from recent COVID-19 virus infection  He will have completed a full 10 day quarantine on the 23rd of May, and may return to work without restriction on Monday 5/24/2021  If you have any questions or concerns, please don't hesitate to call           Sincerely,          Myah Prieto PA-C        CC: No Recipients

## 2021-05-18 NOTE — TELEPHONE ENCOUNTER
Patient notified of positive COVID-19 test results  Advised to start vitamin-C, D, and multivitamin  Advised to monitor pulse ox, if you drop below 94% advised to return or  to proceed ER  Or you develop any new or worsening symptoms  Advised follow-up with PCP in 24 hours  Instructed to self isolate  Will need to be out of work for 10 days from symptom onset  Patient verbalizes understanding  Patient states that he has f/u appt scheduled with his pcp

## 2022-05-08 ENCOUNTER — OFFICE VISIT (OUTPATIENT)
Dept: URGENT CARE | Facility: MEDICAL CENTER | Age: 32
End: 2022-05-08
Payer: COMMERCIAL

## 2022-05-08 ENCOUNTER — APPOINTMENT (OUTPATIENT)
Dept: RADIOLOGY | Facility: MEDICAL CENTER | Age: 32
End: 2022-05-08
Payer: COMMERCIAL

## 2022-05-08 VITALS
HEART RATE: 108 BPM | HEIGHT: 73 IN | OXYGEN SATURATION: 96 % | BODY MASS INDEX: 31.14 KG/M2 | RESPIRATION RATE: 20 BRPM | TEMPERATURE: 98.8 F | SYSTOLIC BLOOD PRESSURE: 142 MMHG | WEIGHT: 235 LBS | DIASTOLIC BLOOD PRESSURE: 100 MMHG

## 2022-05-08 DIAGNOSIS — S69.91XA HAND INJURY, RIGHT, INITIAL ENCOUNTER: Primary | ICD-10-CM

## 2022-05-08 DIAGNOSIS — S69.91XA HAND INJURY, RIGHT, INITIAL ENCOUNTER: ICD-10-CM

## 2022-05-08 PROCEDURE — 99213 OFFICE O/P EST LOW 20 MIN: CPT | Performed by: EMERGENCY MEDICINE

## 2022-05-08 PROCEDURE — 73130 X-RAY EXAM OF HAND: CPT
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
## 2022-05-08 NOTE — PATIENT INSTRUCTIONS
Contusion in Adults   WHAT YOU NEED TO KNOW:   A contusion is a bruise that appears on your skin after an injury  A bruise happens when small blood vessels tear but skin does not  Blood leaks into nearby tissue, such as soft tissue or muscle  DISCHARGE INSTRUCTIONS:   Return to the emergency department if:   · You have new trouble moving the injured area  · You have tingling or numbness in or near the injured area  · Your hand or foot below the bruise gets cold or turns pale  Call your doctor if:   · You find a new lump in the injured area  · Your symptoms do not improve with treatment after 4 to 5 days  · You have questions or concerns about your condition or care  Medicines: You may need any of the following:  · NSAIDs  help decrease swelling and pain or fever  This medicine is available with or without a doctor's order  NSAIDs can cause stomach bleeding or kidney problems in certain people  If you take blood thinner medicine, always ask your healthcare provider if NSAIDs are safe for you  Always read the medicine label and follow directions  · Prescription pain medicine  may be given  Ask your healthcare provider how to take this medicine safely  Some prescription pain medicines contain acetaminophen  Do not take other medicines that contain acetaminophen without talking to your healthcare provider  Too much acetaminophen may cause liver damage  Prescription pain medicine may cause constipation  Ask your healthcare provider how to prevent or treat constipation  · Take your medicine as directed  Contact your healthcare provider if you think your medicine is not helping or if you have side effects  Tell him of her if you are allergic to any medicine  Keep a list of the medicines, vitamins, and herbs you take  Include the amounts, and when and why you take them  Bring the list or the pill bottles to follow-up visits  Carry your medicine list with you in case of an emergency      Help raul contusion heal:   · Rest the injured area  or use it less than usual  If you bruised your leg or foot, you may need crutches or a cane to help you walk  This will help you keep weight off your injured body part  · Apply ice  to decrease swelling and pain  Ice may also help prevent tissue damage  Use an ice pack, or put crushed ice in a plastic bag  Cover it with a towel and place it on your bruise for 15 to 20 minutes every hour or as directed  · Use compression  to support the area and decrease swelling  Wrap an elastic bandage around the area over the bruised muscle  Make sure the bandage is not too tight  You should be able to fit 1 finger between the bandage and your skin  · Elevate (raise) your injured body part  above the level of your heart to help decrease pain and swelling  Use pillows, blankets, or rolled towels to elevate the area as often as you can  · Do not drink alcohol  as directed  Alcohol may slow healing  · Do not stretch injured muscles  right after your injury  Ask your healthcare provider when and how you may safely stretch after your injury  Gentle stretches can help increase your flexibility  · Do not massage the area or put heating pads  on the bruise right after your injury  Heat and massage may slow healing  Your healthcare provider may tell you to apply heat after several days  At that time, heat will start to help the injury heal     Prevent another contusion:   · Stretch and warm up before you play sports or exercise  · Wear protective gear when you play sports  Examples are shin guards and padding  · If you begin a new physical activity, start slowly to give your body a chance to adjust     Follow up with your doctor as directed:  Write down your questions so you remember to ask them during your visits  © Copyright [employer] 2022 Information is for End User's use only and may not be sold, redistributed or otherwise used for commercial purposes   All illustrations and images included in CareNotes® are the copyrighted property of [employer]  or April Benítez  The above information is an  only  It is not intended as medical advice for individual conditions or treatments  Talk to your doctor, nurse or pharmacist before following any medical regimen to see if it is safe and effective for you

## 2022-05-11 ENCOUNTER — OFFICE VISIT (OUTPATIENT)
Dept: FAMILY MEDICINE CLINIC | Facility: CLINIC | Age: 32
End: 2022-05-11
Payer: COMMERCIAL

## 2022-05-11 ENCOUNTER — APPOINTMENT (OUTPATIENT)
Dept: LAB | Facility: HOSPITAL | Age: 32
End: 2022-05-11
Payer: COMMERCIAL

## 2022-05-11 ENCOUNTER — HOSPITAL ENCOUNTER (OUTPATIENT)
Dept: CT IMAGING | Facility: HOSPITAL | Age: 32
Discharge: HOME | End: 2022-05-11
Payer: COMMERCIAL

## 2022-05-11 VITALS
BODY MASS INDEX: 31.54 KG/M2 | SYSTOLIC BLOOD PRESSURE: 130 MMHG | HEART RATE: 80 BPM | HEIGHT: 73 IN | DIASTOLIC BLOOD PRESSURE: 84 MMHG | WEIGHT: 238 LBS

## 2022-05-11 DIAGNOSIS — S06.0X0A CONCUSSION WITHOUT LOSS OF CONSCIOUSNESS, INITIAL ENCOUNTER: ICD-10-CM

## 2022-05-11 DIAGNOSIS — S09.90XA TRAUMATIC INJURY OF HEAD, INITIAL ENCOUNTER: Primary | ICD-10-CM

## 2022-05-11 DIAGNOSIS — E78.2 MIXED HYPERLIPIDEMIA: ICD-10-CM

## 2022-05-11 DIAGNOSIS — E66.9 OBESITY (BMI 30-39.9): ICD-10-CM

## 2022-05-11 DIAGNOSIS — R41.3 MEMORY DIFFICULTIES: ICD-10-CM

## 2022-05-11 DIAGNOSIS — R26.89 BALANCE PROBLEM: ICD-10-CM

## 2022-05-11 DIAGNOSIS — S09.90XA TRAUMATIC INJURY OF HEAD, INITIAL ENCOUNTER: ICD-10-CM

## 2022-05-11 DIAGNOSIS — H53.9 VISION ABNORMALITIES: ICD-10-CM

## 2022-05-11 DIAGNOSIS — Z11.59 NEED FOR HEPATITIS C SCREENING TEST: ICD-10-CM

## 2022-05-11 DIAGNOSIS — Z11.4 SCREENING FOR HIV (HUMAN IMMUNODEFICIENCY VIRUS): ICD-10-CM

## 2022-05-11 DIAGNOSIS — F31.9 BIPOLAR DEPRESSION (HCC): ICD-10-CM

## 2022-05-11 DIAGNOSIS — Z00.00 HEALTH CARE MAINTENANCE: ICD-10-CM

## 2022-05-11 PROBLEM — U07.1 COVID-19: Status: RESOLVED | Noted: 2021-05-18 | Resolved: 2022-05-11

## 2022-05-11 LAB
ALBUMIN SERPL BCP-MCNC: 4.2 G/DL (ref 3.5–5)
ALP SERPL-CCNC: 56 U/L (ref 46–116)
ALT SERPL W P-5'-P-CCNC: 48 U/L (ref 12–78)
ANION GAP SERPL CALCULATED.3IONS-SCNC: 8 MMOL/L (ref 4–13)
AST SERPL W P-5'-P-CCNC: 22 U/L (ref 5–45)
BILIRUB SERPL-MCNC: 0.82 MG/DL (ref 0.2–1)
BILIRUB UR QL STRIP: NEGATIVE
BUN SERPL-MCNC: 10 MG/DL (ref 5–25)
CALCIUM SERPL-MCNC: 8.9 MG/DL (ref 8.3–10.1)
CHLORIDE SERPL-SCNC: 103 MMOL/L (ref 100–108)
CHOLEST SERPL-MCNC: 227 MG/DL
CLARITY UR: CLEAR
CO2 SERPL-SCNC: 29 MMOL/L (ref 21–32)
COLOR UR: NORMAL
CREAT SERPL-MCNC: 0.98 MG/DL (ref 0.6–1.3)
ERYTHROCYTE [DISTWIDTH] IN BLOOD BY AUTOMATED COUNT: 11.9 % (ref 11.6–15.1)
FOLATE SERPL-MCNC: 18.9 NG/ML (ref 3.1–17.5)
GFR SERPL CREATININE-BSD FRML MDRD: 101 ML/MIN/1.73SQ M
GLUCOSE SERPL-MCNC: 88 MG/DL (ref 65–140)
GLUCOSE UR STRIP-MCNC: NEGATIVE MG/DL
HCT VFR BLD AUTO: 47.4 % (ref 36.5–49.3)
HCV AB SER QL: NORMAL
HDLC SERPL-MCNC: 31 MG/DL
HGB BLD-MCNC: 15.4 G/DL (ref 12–17)
HGB UR QL STRIP.AUTO: NEGATIVE
KETONES UR STRIP-MCNC: NEGATIVE MG/DL
LDLC SERPL CALC-MCNC: 144 MG/DL (ref 0–100)
LEUKOCYTE ESTERASE UR QL STRIP: NEGATIVE
MCH RBC QN AUTO: 29.8 PG (ref 26.8–34.3)
MCHC RBC AUTO-ENTMCNC: 32.5 G/DL (ref 31.4–37.4)
MCV RBC AUTO: 92 FL (ref 82–98)
NITRITE UR QL STRIP: NEGATIVE
PH UR STRIP.AUTO: 7.5 [PH]
PLATELET # BLD AUTO: 257 THOUSANDS/UL (ref 149–390)
PMV BLD AUTO: 11.2 FL (ref 8.9–12.7)
POTASSIUM SERPL-SCNC: 4.2 MMOL/L (ref 3.5–5.3)
PROT SERPL-MCNC: 8.1 G/DL (ref 6.4–8.2)
PROT UR STRIP-MCNC: NEGATIVE MG/DL
RBC # BLD AUTO: 5.16 MILLION/UL (ref 3.88–5.62)
SODIUM SERPL-SCNC: 140 MMOL/L (ref 136–145)
SP GR UR STRIP.AUTO: 1.01 (ref 1–1.03)
TRIGL SERPL-MCNC: 260 MG/DL
TSH SERPL DL<=0.05 MIU/L-ACNC: 1.84 UIU/ML (ref 0.45–4.5)
UROBILINOGEN UR QL STRIP.AUTO: 1 E.U./DL
VIT B12 SERPL-MCNC: 351 PG/ML (ref 100–900)
WBC # BLD AUTO: 9.19 THOUSAND/UL (ref 4.31–10.16)

## 2022-05-11 PROCEDURE — 87389 HIV-1 AG W/HIV-1&-2 AB AG IA: CPT

## 2022-05-11 PROCEDURE — 3008F BODY MASS INDEX DOCD: CPT | Performed by: FAMILY MEDICINE

## 2022-05-11 PROCEDURE — 99214 OFFICE O/P EST MOD 30 MIN: CPT | Performed by: FAMILY MEDICINE

## 2022-05-11 PROCEDURE — 82175 ASSAY OF ARSENIC: CPT

## 2022-05-11 PROCEDURE — 86803 HEPATITIS C AB TEST: CPT

## 2022-05-11 PROCEDURE — 82607 VITAMIN B-12: CPT

## 2022-05-11 PROCEDURE — 1036F TOBACCO NON-USER: CPT | Performed by: FAMILY MEDICINE

## 2022-05-11 PROCEDURE — 84443 ASSAY THYROID STIM HORMONE: CPT

## 2022-05-11 PROCEDURE — 86592 SYPHILIS TEST NON-TREP QUAL: CPT

## 2022-05-11 PROCEDURE — 80061 LIPID PANEL: CPT

## 2022-05-11 PROCEDURE — 83655 ASSAY OF LEAD: CPT

## 2022-05-11 PROCEDURE — 82300 ASSAY OF CADMIUM: CPT

## 2022-05-11 PROCEDURE — G1004 CDSM NDSC: HCPCS

## 2022-05-11 PROCEDURE — 70450 CT HEAD/BRAIN W/O DYE: CPT

## 2022-05-11 PROCEDURE — 81003 URINALYSIS AUTO W/O SCOPE: CPT

## 2022-05-11 PROCEDURE — 85027 COMPLETE CBC AUTOMATED: CPT

## 2022-05-11 PROCEDURE — 83825 ASSAY OF MERCURY: CPT

## 2022-05-11 PROCEDURE — 86618 LYME DISEASE ANTIBODY: CPT

## 2022-05-11 PROCEDURE — 80053 COMPREHEN METABOLIC PANEL: CPT

## 2022-05-11 PROCEDURE — 36415 COLL VENOUS BLD VENIPUNCTURE: CPT

## 2022-05-11 PROCEDURE — 82746 ASSAY OF FOLIC ACID SERUM: CPT

## 2022-05-11 NOTE — PROGRESS NOTES
Assessment and Plan:  1  Head trauma  2  Vision and my  3  Balance bowel   4  Concussion  5  Memory difficulties  Rule out traumatic brain injury   CT ordered   Blood work ordered  Refer to physical therapy/concussion protocol   Refer to neurology  6  Bipolar depression, in remission without medication   7  Hyperlipidemia, blood work is ordered   8  BMI 31 4 this is inaccurate diagnosis   9  Health care maintenance hepatitis-C and HIV screen discussed order placed  10  Recheck 2 weeks    Problem List Items Addressed This Visit        Other    Obesity (BMI 30-39  9)     BMI 31 4 this is an inaccurate diagnosis           Relevant Orders    CT head wo contrast    CBC    Comprehensive metabolic panel    Lipid Panel with Direct LDL reflex    TSH, 3rd generation with Free T4 reflex    UA (URINE) with reflex to Scope    Vitamin B12    Folate    Lyme Total Antibody Profile with reflex to WB    RPR    Heavy metals screen    Mixed hyperlipidemia     Blood work ordered           Relevant Orders    CT head wo contrast    CBC    Comprehensive metabolic panel    Lipid Panel with Direct LDL reflex    TSH, 3rd generation with Free T4 reflex    UA (URINE) with reflex to Scope    Vitamin B12    Folate    Lyme Total Antibody Profile with reflex to WB    RPR    Heavy metals screen    Bipolar depression (HCC)     Seems to be in remission without medication           Relevant Orders    CT head wo contrast    CBC    Comprehensive metabolic panel    Lipid Panel with Direct LDL reflex    TSH, 3rd generation with Free T4 reflex    UA (URINE) with reflex to Scope    Vitamin B12    Folate    Lyme Total Antibody Profile with reflex to WB    RPR    Heavy metals screen    Health care maintenance     Discussed hepatitis-C and HIV screening, ordered           Relevant Orders    CT head wo contrast    CBC    Comprehensive metabolic panel    Lipid Panel with Direct LDL reflex    TSH, 3rd generation with Free T4 reflex    UA (URINE) with reflex to Scope    Vitamin B12    Folate    Lyme Total Antibody Profile with reflex to WB    RPR    Heavy metals screen      Other Visit Diagnoses     Traumatic injury of head, initial encounter    -  Primary    Relevant Orders    CT head wo contrast    CBC    Comprehensive metabolic panel    Lipid Panel with Direct LDL reflex    TSH, 3rd generation with Free T4 reflex    UA (URINE) with reflex to Scope    Vitamin B12    Folate    Lyme Total Antibody Profile with reflex to WB    RPR    Heavy metals screen    Ambulatory Referral to Physical Therapy    Ambulatory Referral to Neurology    Vision abnormalities        Relevant Orders    CT head wo contrast    CBC    Comprehensive metabolic panel    Lipid Panel with Direct LDL reflex    TSH, 3rd generation with Free T4 reflex    UA (URINE) with reflex to Scope    Vitamin B12    Folate    Lyme Total Antibody Profile with reflex to WB    RPR    Heavy metals screen    Ambulatory Referral to Physical Therapy    Ambulatory Referral to Neurology    Balance problem        Relevant Orders    CT head wo contrast    CBC    Comprehensive metabolic panel    Lipid Panel with Direct LDL reflex    TSH, 3rd generation with Free T4 reflex    UA (URINE) with reflex to Scope    Vitamin B12    Folate    Lyme Total Antibody Profile with reflex to WB    RPR    Heavy metals screen    Ambulatory Referral to Physical Therapy    Ambulatory Referral to Neurology    Concussion without loss of consciousness, initial encounter        Relevant Orders    CT head wo contrast    CBC    Comprehensive metabolic panel    Lipid Panel with Direct LDL reflex    TSH, 3rd generation with Free T4 reflex    UA (URINE) with reflex to Scope    Vitamin B12    Folate    Lyme Total Antibody Profile with reflex to WB    RPR    Heavy metals screen    Ambulatory Referral to Physical Therapy    Ambulatory Referral to Neurology    Memory difficulties        Relevant Orders    CT head wo contrast    CBC    Comprehensive metabolic panel Lipid Panel with Direct LDL reflex    TSH, 3rd generation with Free T4 reflex    UA (URINE) with reflex to Scope    Vitamin B12    Folate    Lyme Total Antibody Profile with reflex to WB    RPR    Heavy metals screen    Ambulatory Referral to Physical Therapy    Ambulatory Referral to Neurology    Need for hepatitis C screening test        Relevant Orders    CT head wo contrast    CBC    Comprehensive metabolic panel    Lipid Panel with Direct LDL reflex    TSH, 3rd generation with Free T4 reflex    UA (URINE) with reflex to Scope    Vitamin B12    Folate    Lyme Total Antibody Profile with reflex to WB    RPR    Heavy metals screen    Hepatitis C Antibody ([facility])    Screening for HIV (human immunodeficiency virus)        Relevant Orders    CT head wo contrast    CBC    Comprehensive metabolic panel    Lipid Panel with Direct LDL reflex    TSH, 3rd generation with Free T4 reflex    UA (URINE) with reflex to Scope    Vitamin B12    Folate    Lyme Total Antibody Profile with reflex to WB    RPR    Heavy metals screen    HIV 1/2 Antigen/Antibody (4th Generation) w Reflex [facility]                 Diagnoses and all orders for this visit:    Traumatic injury of head, initial encounter  -     CT head wo contrast; Future  -     CBC; Future  -     Comprehensive metabolic panel; Future  -     Lipid Panel with Direct LDL reflex; Future  -     TSH, 3rd generation with Free T4 reflex; Future  -     UA (URINE) with reflex to Scope; Future  -     Vitamin B12; Future  -     Folate; Future  -     Lyme Total Antibody Profile with reflex to WB; Future  -     RPR; Future  -     Heavy metals screen; Future  -     Ambulatory Referral to Physical Therapy; Future  -     Ambulatory Referral to Neurology; Future    Vision abnormalities  -     CT head wo contrast; Future  -     CBC; Future  -     Comprehensive metabolic panel; Future  -     Lipid Panel with Direct LDL reflex;  Future  -     TSH, 3rd generation with Free T4 reflex; Future  -     UA (URINE) with reflex to Scope; Future  -     Vitamin B12; Future  -     Folate; Future  -     Lyme Total Antibody Profile with reflex to WB; Future  -     RPR; Future  -     Heavy metals screen; Future  -     Ambulatory Referral to Physical Therapy; Future  -     Ambulatory Referral to Neurology; Future    Balance problem  -     CT head wo contrast; Future  -     CBC; Future  -     Comprehensive metabolic panel; Future  -     Lipid Panel with Direct LDL reflex; Future  -     TSH, 3rd generation with Free T4 reflex; Future  -     UA (URINE) with reflex to Scope; Future  -     Vitamin B12; Future  -     Folate; Future  -     Lyme Total Antibody Profile with reflex to WB; Future  -     RPR; Future  -     Heavy metals screen; Future  -     Ambulatory Referral to Physical Therapy; Future  -     Ambulatory Referral to Neurology; Future    Concussion without loss of consciousness, initial encounter  -     CT head wo contrast; Future  -     CBC; Future  -     Comprehensive metabolic panel; Future  -     Lipid Panel with Direct LDL reflex; Future  -     TSH, 3rd generation with Free T4 reflex; Future  -     UA (URINE) with reflex to Scope; Future  -     Vitamin B12; Future  -     Folate; Future  -     Lyme Total Antibody Profile with reflex to WB; Future  -     RPR; Future  -     Heavy metals screen; Future  -     Ambulatory Referral to Physical Therapy; Future  -     Ambulatory Referral to Neurology; Future    Memory difficulties  -     CT head wo contrast; Future  -     CBC; Future  -     Comprehensive metabolic panel; Future  -     Lipid Panel with Direct LDL reflex; Future  -     TSH, 3rd generation with Free T4 reflex; Future  -     UA (URINE) with reflex to Scope; Future  -     Vitamin B12; Future  -     Folate; Future  -     Lyme Total Antibody Profile with reflex to WB; Future  -     RPR; Future  -     Heavy metals screen; Future  -     Ambulatory Referral to Physical Therapy;  Future  -     Ambulatory Referral to Neurology; Future    Bipolar depression ([facility] [address] )  -     CT head wo contrast; Future  -     CBC; Future  -     Comprehensive metabolic panel; Future  -     Lipid Panel with Direct LDL reflex; Future  -     TSH, 3rd generation with Free T4 reflex; Future  -     UA (URINE) with reflex to Scope; Future  -     Vitamin B12; Future  -     Folate; Future  -     Lyme Total Antibody Profile with reflex to WB; Future  -     RPR; Future  -     Heavy metals screen; Future    Health care maintenance  -     CT head wo contrast; Future  -     CBC; Future  -     Comprehensive metabolic panel; Future  -     Lipid Panel with Direct LDL reflex; Future  -     TSH, 3rd generation with Free T4 reflex; Future  -     UA (URINE) with reflex to Scope; Future  -     Vitamin B12; Future  -     Folate; Future  -     Lyme Total Antibody Profile with reflex to WB; Future  -     RPR; Future  -     Heavy metals screen; Future    Mixed hyperlipidemia  -     CT head wo contrast; Future  -     CBC; Future  -     Comprehensive metabolic panel; Future  -     Lipid Panel with Direct LDL reflex; Future  -     TSH, 3rd generation with Free T4 reflex; Future  -     UA (URINE) with reflex to Scope; Future  -     Vitamin B12; Future  -     Folate; Future  -     Lyme Total Antibody Profile with reflex to WB; Future  -     RPR; Future  -     Heavy metals screen; Future    Obesity (BMI 30-39 9)  -     CT head wo contrast; Future  -     CBC; Future  -     Comprehensive metabolic panel; Future  -     Lipid Panel with Direct LDL reflex; Future  -     TSH, 3rd generation with Free T4 reflex; Future  -     UA (URINE) with reflex to Scope; Future  -     Vitamin B12; Future  -     Folate; Future  -     Lyme Total Antibody Profile with reflex to WB; Future  -     RPR; Future  -     Heavy metals screen; Future    Need for hepatitis C screening test  -     CT head wo contrast; Future  -     CBC; Future  -     Comprehensive metabolic panel;  Future  -     Lipid Panel with Direct LDL reflex; Future  -     TSH, 3rd generation with Free T4 reflex; Future  -     UA (URINE) with reflex to Scope; Future  -     Vitamin B12; Future  -     Folate; Future  -     Lyme Total Antibody Profile with reflex to WB; Future  -     RPR; Future  -     Heavy metals screen; Future  -     Hepatitis C Antibody ([facility]); Future    Screening for HIV (human immunodeficiency virus)  -     CT head wo contrast; Future  -     CBC; Future  -     Comprehensive metabolic panel; Future  -     Lipid Panel with Direct LDL reflex; Future  -     TSH, 3rd generation with Free T4 reflex; Future  -     UA (URINE) with reflex to Scope; Future  -     Vitamin B12; Future  -     Folate; Future  -     Lyme Total Antibody Profile with reflex to WB; Future  -     RPR; Future  -     Heavy metals screen; Future  -     HIV 1/2 Antigen/Antibody (4th Generation) w Reflex [facility]; Future            Subjective:      Patient ID: Andrew Venegas is a 27 y o  male  CC:    Chief Complaint   Patient presents with    Head Injury     Per pt states he plays football and tackle someone         HPI:    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 a player and seemed to have a concussion  Had multiple since that time but Saturday he had another incident  He tackled a player he was wearing a helmet his home a contacted the ground patient had vision changes is balance was off for 3-5 minutes  Patient states he has had some memory issues since 2019  She was never seen by specialist or evaluated for this in the past       The following portions of the patient's history were reviewed and updated as appropriate: allergies, current medications, past family history, past medical history, past social history, past surgical history and problem list       Review of Systems   Constitutional: Negative  HENT: Negative  Eyes:        HPI   Respiratory: Negative  Cardiovascular: Negative      Gastrointestinal: Negative  Endocrine: Negative  Genitourinary: Negative  Musculoskeletal: Negative  Skin: Negative  Allergic/Immunologic: Negative  Neurological:        HPI   Hematological: Negative  Psychiatric/Behavioral: Negative  Data to review:       Objective:    Vitals:    05/11/22 0732 05/11/22 0741   BP: 140/90 130/84   BP Location: Right arm    Patient Position: Sitting    Cuff Size: Standard    Pulse: 80    Weight: 108 kg (238 lb)    Height: 6' 1" (1 854 m)         Physical Exam  Vitals and nursing note reviewed  Constitutional:       General: He is not in acute distress  Appearance: Normal appearance  He is not ill-appearing, toxic-appearing or diaphoretic  Comments: More muscular in average male his height weight and age   HENT:      Head: Normocephalic and atraumatic  Right Ear: Tympanic membrane normal       Left Ear: Tympanic membrane normal       Nose: Nose normal       Mouth/Throat:      Mouth: Mucous membranes are moist       Pharynx: Oropharynx is clear  No oropharyngeal exudate or posterior oropharyngeal erythema  Eyes:      General: No scleral icterus  Right eye: No discharge  Left eye: No discharge  Extraocular Movements: Extraocular movements intact  Conjunctiva/sclera: Conjunctivae normal       Pupils: Pupils are equal, round, and reactive to light  Neck:      Vascular: No carotid bruit  Cardiovascular:      Rate and Rhythm: Normal rate and regular rhythm  Heart sounds: Normal heart sounds  Pulmonary:      Effort: Pulmonary effort is normal       Breath sounds: Normal breath sounds  Abdominal:      General: Bowel sounds are normal       Palpations: Abdomen is soft  Tenderness: There is no abdominal tenderness  Musculoskeletal:      Cervical back: Neck supple  No rigidity or tenderness  Right lower leg: No edema  Left lower leg: No edema  Lymphadenopathy:      Cervical: No cervical adenopathy     Skin: General: Skin is warm and dry  Neurological:      General: No focal deficit present  Mental Status: He is alert and oriented to person, place, and time  Cranial Nerves: No cranial nerve deficit  Sensory: No sensory deficit  Motor: No weakness  Coordination: Coordination normal       Gait: Gait normal    Psychiatric:         Mood and Affect: Mood normal            BMI Counseling: Body mass index is 31 4 kg/m²  The BMI is above normal  Nutrition recommendations include moderation in carbohydrate intake and reducing intake of cholesterol  Exercise recommendations include exercising 3-5 times per week  Rationale for BMI follow-up plan is due to patient being overweight or obese

## 2022-05-11 NOTE — PATIENT INSTRUCTIONS
Complete blood work today   Complete CT today   Follow-up physical therapy  Follow-up Neurology   Recheck 2 weeks

## 2022-05-12 LAB
B BURGDOR IGG+IGM SER-ACNC: 15
HIV 1+2 AB+HIV1 P24 AG SERPL QL IA: NORMAL
RPR SER QL: NORMAL

## 2022-05-13 LAB
ARSENIC BLD-MCNC: 1 UG/L (ref 0–9)
CADMIUM BLD-MCNC: <0.5 UG/L (ref 0–1.2)
LEAD BLD-MCNC: <1 UG/DL (ref 0–4)
MERCURY BLD-MCNC: <1 UG/L (ref 0–14.9)

## 2022-05-17 ENCOUNTER — TELEPHONE (OUTPATIENT)
Dept: FAMILY MEDICINE CLINIC | Facility: CLINIC | Age: 32
End: 2022-05-17

## 2022-05-17 DIAGNOSIS — G44.319 ACUTE POST-TRAUMATIC HEADACHE, NOT INTRACTABLE: Primary | ICD-10-CM

## 2022-05-17 RX ORDER — HYDROXYZINE HYDROCHLORIDE 25 MG/1
25-50 TABLET, FILM COATED ORAL EVERY 6 HOURS PRN
COMMUNITY
Start: 2022-05-16

## 2022-05-17 RX ORDER — DICLOFENAC POTASSIUM 50 MG/1
TABLET, FILM COATED ORAL
Qty: 30 TABLET | Refills: 0 | Status: SHIPPED | OUTPATIENT
Start: 2022-05-17

## 2022-05-17 RX ORDER — LAMOTRIGINE 25 MG/1
25 TABLET ORAL DAILY
COMMUNITY
Start: 2022-05-16

## 2022-05-17 NOTE — TELEPHONE ENCOUNTER
Pt called in because he is still having headaches and at times he cannot function  He has an appt with pt 5/19/22  I asked if he called Neurology to make an appt and he said he did not   I told him to call them first and I was going to send Dr James Camilo a mess

## 2022-05-17 NOTE — TELEPHONE ENCOUNTER
Cataflam sent to pharmacy he may use 1 every 8 hours as needed for headache    He should take with food

## 2022-05-19 ENCOUNTER — EVALUATION (OUTPATIENT)
Dept: PHYSICAL THERAPY | Facility: REHABILITATION | Age: 32
End: 2022-05-19
Payer: COMMERCIAL

## 2022-05-19 DIAGNOSIS — S06.0X0A CONCUSSION WITHOUT LOSS OF CONSCIOUSNESS, INITIAL ENCOUNTER: ICD-10-CM

## 2022-05-19 DIAGNOSIS — R41.3 MEMORY DIFFICULTIES: ICD-10-CM

## 2022-05-19 DIAGNOSIS — S09.90XA TRAUMATIC INJURY OF HEAD, INITIAL ENCOUNTER: Primary | ICD-10-CM

## 2022-05-19 DIAGNOSIS — R26.89 BALANCE PROBLEM: ICD-10-CM

## 2022-05-19 DIAGNOSIS — H53.9 VISION ABNORMALITIES: ICD-10-CM

## 2022-05-19 PROBLEM — F41.1 GAD (GENERALIZED ANXIETY DISORDER): Status: ACTIVE | Noted: 2022-05-16

## 2022-05-19 PROCEDURE — 97162 PT EVAL MOD COMPLEX 30 MIN: CPT | Performed by: PHYSICAL THERAPIST

## 2022-05-19 NOTE — PROGRESS NOTES
PT Evaluation     Today's date: 2022  Patient name: Verna Yan  : 1990  MRN: [id number]  Referring provider: Veronica Bartholomew DO  Dx:   Encounter Diagnosis     ICD-10-CM    1  Traumatic injury of head, initial encounter  S09 90XA Ambulatory Referral to Physical Therapy   2  Vision abnormalities  H53 9 Ambulatory Referral to Physical Therapy   3  Balance problem  R26 89 Ambulatory Referral to Physical Therapy   4  Concussion without loss of consciousness, initial encounter  S06 0X0A Ambulatory Referral to Physical Therapy   5  Memory difficulties  R41 3 Ambulatory Referral to Physical Therapy        tx by Tania Rangel SPT under direct supervision of Eder Camacho DPT  Assessment  Assessment details: Verna Yan is a 27 y o  male who is coming to OP PT for vision instability, dizziness, headaches and balance deficits secondary to PMHx of multiple concussions  Pt most recent concussion was at the end of 2022  Pt presented with cervical muscle soreness upon palpation to posterior neck musculature and C2 seated spring test  Pt presented with oculomotor deficits in smooth pursuit, saccades ( horizontal and vertical), convergence (sees double at 11 cm), and VOR x 1 ( horizontal and vertical)  Pt presented with a positive head trust test when head was quickly turned to the R  With vertical saccades and VOR x 1 pt consistently had a saccadic correction and dizziness when looking down  FGA determined that the Pt is not a fall risk, however, pt presented with a deviation and mild unsteadiness in gait when vision was occluded which is related to his central vestibular dysfunction  Pt also reports difficulty with working out due to headaches and room spining dizziness and exertion testing will be completed NV  Pt will benefit from skilled OP PT to decrease dizziness and vision instability with functional mobility, and to return to working out regularly and safely  Recommended referral to OT or speech therapy as well for concentration and memory and word finding  Pt stated he is not interested at this time  Impairments: abnormal coordination, activity intolerance, impaired balance and lacks appropriate home exercise program  Barriers to therapy: PMHx of multiple concussions     Goals  ST  Pt will be able to perform oculomotor and aerobic endurance HEP independently  2  Pt will report being able to ambulate on the TM without more than a 3 point change in symptoms for 10 min  3  Pt will be able to perform VOR x 1 exercises ( horizontally and vertically) for 2 min continuously without more than a 3-4 point increase in sxs to cause adaptation to the VOR  LT  Pt will be able to perform functional mobility at home and in the community daily with no more than a 3 to 4 point increase in sxs and no LOB  2  Pt will be able to report strategies to self- manage sxs of dizziness and headache  3  Pt will be able to tolerate returning to regular gym routine with no more than 3-4/10 onset of sxs  4  Appropriate goal for FELIBERTO test and [place] Concussion TM test be created  Plan  Plan details: Initiate POC   NV perform [place] Concussion TM test and Feliberto test    Patient would benefit from: skilled physical therapy  Planned therapy interventions: balance, therapeutic activities, therapeutic exercise, home exercise program, coordination, breathing training, manual therapy, neuromuscular re-education, patient education, postural training and stretching  Frequency: 2x week  Duration in weeks: 8  Plan of Care beginning date: 2022  Plan of Care expiration date: 2022  Treatment plan discussed with: patient        Subjective Evaluation    History of Present Illness  Mechanism of injury: Devi Green is a 27 y o  male who is coming to OP PT due to balance impairment secondary to PMHx of multiple concussions 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  2 years ago he reports to have had a bad concussion and now has trouble with memory  Pt also reports difficulty comprehending while reading ( it takes him a lot longer) and when writing emails  His major deficit is processing information quickly  Last Saturday in April he also had another concussion  Pt reports he has a low grade headache constantly from this, but has migraine style HA intermittently  Pt reports he is having dizziness while moving including with squats, and when getting to/out of bed  This dizziness is sometimes room spinning and sometimes lightheadedness  Pt states his vision often goes blurry for 30s and after blinking for a few seconds he is able to restore his vision  Pt reports issues with balance when doing laundry and especially anytime when vision gets blurry and when dizzy  Pt reports neck muscles are sore but motion is not restricted  Pt reports he regularly gets lightheaded and headaches when working out  Pt lives in a ranch style home  3 CAIT with HRs  Pt has a FF of steps going to the 2nd floor  Pt lives with his wife and 3 kids  Pt works full-time  Goals: Return to working out, decrease dizziness, and vision instability which leads to imbalance  Pain  No pain reported          Objective      Resting BP: 132/82  Resting HR: 92bpm  SPO2: 96%     [place] Concussion Treadmill Test: Do NV       CERVICAL     Ligament Laxity Testing:     Alar Ligament: negative   Transverse Ligament: negative       Cervical Palpation: tenderness to muscles on posterior neck  PA in sitting to C2 caused radiation of pain lateral neck musculature on the left  Cervical Posture: Mild forward head       Cervical Range of Motion     Flexion WNL WNL   Extension WNL WNL    Left Right   Lateral Flexion WNL WNL   Rotation WNL          VESTIBULAR OCULOMOTOR SCREENING  Oculomotor ROM :   Resting nystagmus: No  Gaze holding nystagmus No   Smooth pursuit Abnormal; Normal coordination, but pt sees double vision     Vertical Saccades:Abnormal Saccadic correction when looking down with eyes   Horizontal Saccades:Abnormal Saccadic corrections noticed  especially when looking to the R   Convergence: Abnormal 11 cm when he sees double vision, coordination looks normal     Cover/Uncover/Crosscover Test: Normal eye alignment     Head thrust (room light):  Abnormal when head turned fast to the right saw corrective saccade twice     VOR cancellation: Abnormal     VORx1 test: Saccadic corrections noticed when turning head to R>L     Perform NV:   Estrella Test (0-60 errors) Eval     Feet together firm     Single leg firm     Tandem firm          Feet together foam     Single leg foam     Tandem foam          Total Score         FGA: 28/30      Precautions: vision blurriness, dizziness with quick movement, Constant headaches   HEP: Circular smooth pursuit 30s x 3 sets, vertical and horizontal saccades 30s x 3, VOR x 1 at 140bpm ( metronome) 30sx3    Manuals                                                                 Neuro Re-Ed             Smooth Pursuits              Horizontal and vertical saccades              VOR x 1             Pencil Push ups (convergence)              Estrella test              Exertion testing                           Ther Ex                                                                                                                     Ther Activity                                       Gait Training                                       Modalities

## 2022-05-27 ENCOUNTER — APPOINTMENT (OUTPATIENT)
Dept: PHYSICAL THERAPY | Facility: REHABILITATION | Age: 32
End: 2022-05-27
Payer: COMMERCIAL

## 2022-05-27 ENCOUNTER — TELEPHONE (OUTPATIENT)
Dept: NEUROLOGY | Facility: CLINIC | Age: 32
End: 2022-05-27

## 2022-05-27 NOTE — TELEPHONE ENCOUNTER
Called and left message for patient to confirm their upcoming appointment with Dr Toña Rodriges on 6/6/22

## 2022-06-02 ENCOUNTER — OFFICE VISIT (OUTPATIENT)
Dept: FAMILY MEDICINE CLINIC | Facility: CLINIC | Age: 32
End: 2022-06-02
Payer: COMMERCIAL

## 2022-06-02 VITALS
BODY MASS INDEX: 30.68 KG/M2 | TEMPERATURE: 97 F | SYSTOLIC BLOOD PRESSURE: 120 MMHG | WEIGHT: 231.5 LBS | DIASTOLIC BLOOD PRESSURE: 90 MMHG | HEIGHT: 73 IN

## 2022-06-02 DIAGNOSIS — S06.0X0A CONCUSSION WITHOUT LOSS OF CONSCIOUSNESS, INITIAL ENCOUNTER: ICD-10-CM

## 2022-06-02 DIAGNOSIS — F31.9 BIPOLAR DEPRESSION (HCC): ICD-10-CM

## 2022-06-02 DIAGNOSIS — F41.1 GAD (GENERALIZED ANXIETY DISORDER): ICD-10-CM

## 2022-06-02 DIAGNOSIS — R26.89 BALANCE PROBLEM: ICD-10-CM

## 2022-06-02 DIAGNOSIS — R41.3 MEMORY DIFFICULTIES: ICD-10-CM

## 2022-06-02 DIAGNOSIS — S09.90XD TRAUMATIC INJURY OF HEAD, SUBSEQUENT ENCOUNTER: ICD-10-CM

## 2022-06-02 DIAGNOSIS — H53.9 VISION ABNORMALITIES: ICD-10-CM

## 2022-06-02 DIAGNOSIS — Z00.00 HEALTH CARE MAINTENANCE: ICD-10-CM

## 2022-06-02 DIAGNOSIS — R80.9 URINE PROTEIN INCREASED: ICD-10-CM

## 2022-06-02 DIAGNOSIS — E78.2 MIXED HYPERLIPIDEMIA: ICD-10-CM

## 2022-06-02 DIAGNOSIS — G44.319 ACUTE POST-TRAUMATIC HEADACHE, NOT INTRACTABLE: Primary | ICD-10-CM

## 2022-06-02 PROCEDURE — 99214 OFFICE O/P EST MOD 30 MIN: CPT | Performed by: FAMILY MEDICINE

## 2022-06-02 NOTE — PATIENT INSTRUCTIONS
Patient follow with physical therapy/concussion Clinic   keep appointment next week with Neurology  low-fat diet recommended  return in 6 months for office visit and blood work sooner if needed

## 2022-06-02 NOTE — PROGRESS NOTES
Assessment and Plan:    1  Acute posttraumatic headache  2  Traumatic injury of head  3  Vision abnormalities  4  Balance problem  5  Concussion  6  Memory difficulty  Blood work and CT were normal     follow with PT/concussion Clinic  patient has upcoming appointment next week with Neurology  7  Hyperlipidemia low-fat diet recommended  8  Health care maintenance, hepatitis seen HIV screening were negative  9  Bipolar/GA D, stable following with Psychiatry  10  Urine protein increase, UA was normal as is resolved  11   Return in 6 months for office visit blood work sooner if needed      Problem List Items Addressed This Visit        Other    Mixed hyperlipidemia     Low-fat diet recommended           Relevant Orders    Comprehensive metabolic panel    Lipid Panel with Direct LDL reflex    Bipolar depression ([facility] [address] )     See specialist on Lamictal           Urine protein increased     UA was normal, resolved           Relevant Orders    Comprehensive metabolic panel    Lipid Panel with Direct LDL reflex    Health care maintenance     Hepatitis-C and HIV screening negative           Relevant Orders    Comprehensive metabolic panel    Lipid Panel with Direct LDL reflex    VENU (generalized anxiety disorder)     As above           Relevant Orders    Comprehensive metabolic panel    Lipid Panel with Direct LDL reflex      Other Visit Diagnoses     Acute post-traumatic headache, not intractable    -  Primary    Relevant Orders    Comprehensive metabolic panel    Lipid Panel with Direct LDL reflex    Traumatic injury of head, subsequent encounter        Relevant Orders    Comprehensive metabolic panel    Lipid Panel with Direct LDL reflex    Vision abnormalities        Relevant Orders    Comprehensive metabolic panel    Lipid Panel with Direct LDL reflex    Balance problem        Relevant Orders    Comprehensive metabolic panel    Lipid Panel with Direct LDL reflex    Concussion without loss of consciousness, initial encounter Relevant Orders    Comprehensive metabolic panel    Lipid Panel with Direct LDL reflex    Memory difficulties        Relevant Orders    Comprehensive metabolic panel    Lipid Panel with Direct LDL reflex                 Diagnoses and all orders for this visit:    Acute post-traumatic headache, not intractable  -     Comprehensive metabolic panel; Future  -     Lipid Panel with Direct LDL reflex; Future    Traumatic injury of head, subsequent encounter  -     Comprehensive metabolic panel; Future  -     Lipid Panel with Direct LDL reflex; Future    Vision abnormalities  -     Comprehensive metabolic panel; Future  -     Lipid Panel with Direct LDL reflex; Future    Balance problem  -     Comprehensive metabolic panel; Future  -     Lipid Panel with Direct LDL reflex; Future    Concussion without loss of consciousness, initial encounter  -     Comprehensive metabolic panel; Future  -     Lipid Panel with Direct LDL reflex; Future    Memory difficulties  -     Comprehensive metabolic panel; Future  -     Lipid Panel with Direct LDL reflex; Future    Mixed hyperlipidemia  -     Comprehensive metabolic panel; Future  -     Lipid Panel with Direct LDL reflex; Future    Health care maintenance  -     Comprehensive metabolic panel; Future  -     Lipid Panel with Direct LDL reflex; Future    VENU (generalized anxiety disorder)  -     Comprehensive metabolic panel; Future  -     Lipid Panel with Direct LDL reflex; Future    Urine protein increased  -     Comprehensive metabolic panel; Future  -     Lipid Panel with Direct LDL reflex; Future    Bipolar depression (HCC)            Subjective:      Patient ID: Norman Wallace is a 27 y o  male  CC:    Chief Complaint   Patient presents with    Follow-up     Head injury  mgb       HPI:    Patient started with PT/concussion Clinic patient has upcoming appointment next week with Neurology  Patient feels vision seems to be improving    Still with occasional headache and memory difficulty, balance is about the same  Blood work and CT were discussed      The following portions of the patient's history were reviewed and updated as appropriate: allergies, current medications, past family history, past medical history, past social history, past surgical history and problem list       Review of Systems   Constitutional: Negative  HENT: Negative  Eyes:        HPI   Respiratory: Negative  Cardiovascular: Negative  Gastrointestinal: Negative  Endocrine: Negative  Genitourinary: Negative  Musculoskeletal: Negative  Skin: Negative  Allergic/Immunologic: Negative  Neurological:        HPI   Hematological: Negative  Psychiatric/Behavioral: Negative  Data to review:       Objective:    Vitals:    06/02/22 1704   BP: 120/90   BP Location: Right arm   Patient Position: Sitting   Cuff Size: Large   Temp: (!) 97 °F (36 1 °C)   TempSrc: Temporal   Weight: 105 kg (231 lb 8 oz)   Height: 6' 1" (1 854 m)        Physical Exam  Vitals and nursing note reviewed  Constitutional:       Appearance: Normal appearance  HENT:      Head: Normocephalic and atraumatic  Eyes:      General: No scleral icterus  Cardiovascular:      Rate and Rhythm: Normal rate and regular rhythm  Heart sounds: Normal heart sounds  Pulmonary:      Effort: Pulmonary effort is normal       Breath sounds: Normal breath sounds  Abdominal:      General: Bowel sounds are normal       Palpations: Abdomen is soft  Tenderness: There is no abdominal tenderness  Musculoskeletal:      Cervical back: Neck supple  No rigidity  Right lower leg: No edema  Left lower leg: No edema  Skin:     General: Skin is warm and dry  Neurological:      General: No focal deficit present  Mental Status: He is alert     Psychiatric:         Mood and Affect: Mood normal

## 2022-06-03 ENCOUNTER — OFFICE VISIT (OUTPATIENT)
Dept: PHYSICAL THERAPY | Facility: REHABILITATION | Age: 32
End: 2022-06-03
Payer: COMMERCIAL

## 2022-06-03 DIAGNOSIS — H53.9 VISION ABNORMALITIES: ICD-10-CM

## 2022-06-03 DIAGNOSIS — R41.3 MEMORY DIFFICULTIES: ICD-10-CM

## 2022-06-03 DIAGNOSIS — S09.90XA TRAUMATIC INJURY OF HEAD, INITIAL ENCOUNTER: Primary | ICD-10-CM

## 2022-06-03 DIAGNOSIS — R26.89 BALANCE PROBLEM: ICD-10-CM

## 2022-06-03 DIAGNOSIS — M54.16 LUMBAR RADICULOPATHY: ICD-10-CM

## 2022-06-03 DIAGNOSIS — S06.0X0A CONCUSSION WITHOUT LOSS OF CONSCIOUSNESS, INITIAL ENCOUNTER: ICD-10-CM

## 2022-06-03 PROCEDURE — 97112 NEUROMUSCULAR REEDUCATION: CPT | Performed by: PHYSICAL THERAPIST

## 2022-06-03 PROCEDURE — 97110 THERAPEUTIC EXERCISES: CPT | Performed by: PHYSICAL THERAPIST

## 2022-06-03 NOTE — PROGRESS NOTES
Daily Note     Today's date: 6/3/2022  Patient name: Tabitha Wilson  : 1990  MRN: [id number]  Referring provider: Irwin De Leon DO  Dx:   Encounter Diagnosis     ICD-10-CM    1  Traumatic injury of head, initial encounter  S09 90XA    2  Vision abnormalities  H53 9    3  Lumbar radiculopathy  M54 16    4  Balance problem  R26 89    5  Concussion without loss of consciousness, initial encounter  S06 0X0A    6  Memory difficulties  R41 3    tx by Elsa Barrios SPT under direct supervision of Pita Beard DPT  Pt treated 1 on 1 from 11:30 to 12:05    Subjective:  Pt states he is feeling exhausted due to work ( has been working since [employer])  Pt states he is a 3/10 HA  Objective: See treatment diary below  92bpm  97%  1st BP: 144/100mmhg; 2nd BP: 129/91mmhg   147/101mmhg post BCTT      BCTT Protocol                   *starting speed 3 6 mph (modified if needed) and 0% incline     **if max incline reach without symptoms, increase speed   4mph each minute, test stopped if >3 Sx increase or 10/10 RPE             Time Speed Incline RPE HR BP Sx    1  min 2 8 1%  hh d 3/10 HA    2 min 2 8 2% 2/10  115bpm     3min 2 8 3%        4 min 2 8 4% 2-3/10  118bpm +1/10HA    5min 2 8 5%        6 min 2 8 6% 3-4/10  126bpm +1/10 unsteady     7 min 2 8 7%        8 min 2 8 8% 3-4/10  131bpm     9 min 2 8 9%        10 min 3 2 9% 4-5  139bpm +1/10 lightheadness    11 min 3 6 9% 6-7  150bpm +2/10 blurriness in vision    12 min 4 4 9%        13 min 4 8 9%        14 min 5 2 9%        15 min 5 6 9%        16 min 6 0  9%        17 min  6 4 9%        18 min  6 8 9%        19 min  7 2 9%        20 min  7 6 9%             Estrella Test (0-60 errors) 2022 ( visit after IE)      Feet together firm  0     Single leg firm  5     Tandem firm  4             Feet together foam  0     Single leg foam  8     Tandem foam  7             Total Score  25          Assessment: Pt performed BCTT this session, and was able to perform test for 11 min at 3  6mph, a 6-7/10 RPE and had a overall 5 point increase in sxs demonstrating decreased aerobic endurance tolerance  Pt scored a 25 on the Estrella test, which is significantly above an age matched norm average score (10 97); this demonstrates balance impairment  Pt demonstrated significantly improved ability to perform smooth pursuits at a fast speed with less than or equal to 1 saccadic correction observed each bout  Initiated pencil push ups today for convergence insufficiency and pt demonstrated diplopia at 8"  Pt was unable to consistently turn a target that looks double into one target  Pt will continue to benefit from skilled OP PT to improve aerobic endurance tolerance, improve balance and gaze stability         Plan: Oculomotor exercises, Aerobic endurance tolerance, balance, habituation      Precautions: vision blurriness, dizziness with quick movement, Constant headaches   HEP: Circular smooth pursuit 30s x 3 sets, vertical and horizontal saccades 30s x 3, VOR x 1 at 140bpm ( metronome) 30sx3, pencil push ups until fatigue       Manuals 6/3                                                                Neuro Re-Ed             Smooth Pursuits  30s x 2 sets ea side, fast speed             Horizontal and vertical saccades  H: 30s x 2  V: 30s x 2  V harder than H            VOR x 1 H: 140bpm, 30s  V: 140bpm, 30s            Pencil Push ups (convergence)  15 reps, 8" diplopia     Modified   10 reps with straw vertical   10 reps with straw horizontal             Estrella test  See above             Exertion testing  See above                          Ther Ex                                                                                                                     Ther Activity                                       Gait Training                                       Modalities

## 2022-06-06 ENCOUNTER — CONSULT (OUTPATIENT)
Dept: NEUROLOGY | Facility: CLINIC | Age: 32
End: 2022-06-06
Payer: COMMERCIAL

## 2022-06-06 VITALS
HEIGHT: 73 IN | BODY MASS INDEX: 30.62 KG/M2 | DIASTOLIC BLOOD PRESSURE: 100 MMHG | WEIGHT: 231 LBS | HEART RATE: 86 BPM | SYSTOLIC BLOOD PRESSURE: 149 MMHG

## 2022-06-06 DIAGNOSIS — G43.009 MIGRAINE WITHOUT AURA AND WITHOUT STATUS MIGRAINOSUS, NOT INTRACTABLE: Primary | ICD-10-CM

## 2022-06-06 DIAGNOSIS — R29.818 SUSPECTED SLEEP APNEA: ICD-10-CM

## 2022-06-06 DIAGNOSIS — Z87.820 H/O CONCUSSION: ICD-10-CM

## 2022-06-06 DIAGNOSIS — G44.329 CHRONIC POST-TRAUMATIC HEADACHE, NOT INTRACTABLE: ICD-10-CM

## 2022-06-06 PROCEDURE — 99245 OFF/OP CONSLTJ NEW/EST HI 55: CPT | Performed by: PSYCHIATRY & NEUROLOGY

## 2022-06-06 PROCEDURE — 1036F TOBACCO NON-USER: CPT | Performed by: PSYCHIATRY & NEUROLOGY

## 2022-06-06 PROCEDURE — 3008F BODY MASS INDEX DOCD: CPT | Performed by: PSYCHIATRY & NEUROLOGY

## 2022-06-06 RX ORDER — RIZATRIPTAN BENZOATE 10 MG/1
10 TABLET ORAL ONCE AS NEEDED
Qty: 9 TABLET | Refills: 6 | Status: SHIPPED | OUTPATIENT
Start: 2022-06-06

## 2022-06-06 NOTE — PATIENT INSTRUCTIONS
Additional Testing:   Neurodiagnostic workup: MRI Brain ordered  Sleep medicine referral for possible sleep apnea  [phone]      Headache/migraine treatment:   Abortive medications (for immediate treatment of a headache): It is ok to take ibuprofen, acetaminophen or naproxen (Advil, Tylenol,  Aleve, Excedrin) if they help your headaches you should limit these to No more than 3 times a week to avoid medication overuse/rebound headaches  For your more moderate to severe migraines take this medication early   Maxalt (rizatriptan) 10mg tabs - take one at the onset of headache  May repeat one time after 1-2 hours if pain has not resolved  (Max 2 a day and 9 a month)       Over the counter preventive supplements for headaches/migraines (if you try, try for 3 months straight)  (to take every day to help prevent headaches - not to take at the time of headache): There are combo pills online of these - none of which regulated by FDA and double check dosing - take appropriate dose only once a day- preventa migraine, migravent, mind ease, migrelief   [x] Magnesium 400mg daily (If any diarrhea or upset stomach, decrease dose  as tolerated)  [] Riboflavin (Vitamin B2) 400mg daily - try online   (FYI B2 may make your urine bright/neon yellow)  AND/OR  [] Herbal medication: Petasites/Butterbur 150 mg daily - try online  (When choosing your Butterbur online or in the store, beware that there are some poor preps containing pyrrolizidine alkaloids (PAs) that can be harmful to the liver  Therefore, do not use butterbur products that are not labeled as PA-free )      Prescription preventive medications for headaches/migraines   (to take every day to help prevent headaches - not to take at the time of headache):  [x] no change to meds since currently having Lamictal titrated         Lifestyle Recommendations:  [x] SLEEP - Maintain a regular sleep schedule: Adults need at least 7-8 hours of uninterrupted a night   Maintain good sleep hygiene:  Going to bed and waking up at consistent times, avoiding excessive daytime naps, avoiding caffeinated beverages in the evening, avoid excessive stimulation in the evening and generally using bed primarily for sleeping  One hour before bedtime would recommend turning lights down lower, decreasing your activity (may read quietly, listen to music at a low volume)  When you get into bed, should eliminate all technology (no texting, emailing, playing with your phone, iPad or tablet in bed)  [x] HYDRATION - Maintain good hydration  Drink  2L of fluid a day (4 typical small water bottles)  [x] DIET - Maintain good nutrition  In particular don't skip meals and try and eat healthy balanced meals regularly  [x] TRIGGERS - Look for other triggers and avoid them: Limit caffeine to 1-2 cups a day or less  Avoid dietary triggers that you have noticed bring on your headaches (this could include aged cheese, peanuts, MSG, aspartame and nitrates)  [x] EXERCISE - physical exercise as we all know is good for you in many ways, and not only is good for your heart, but also is beneficial for your mental health, cognitive health and  chronic pain/headaches  I would encourage at the least 5 days of physical exercise weekly for at least 30 minutes  Education and Follow-up  [x] Please call with any questions or concerns  Of course if any new concerning symptoms go to the emergency department    [x] Follow up 3-4 months, sooner if needed      - As we discussed if there are no abnormalities on the brain MRI that need urgent intervention (very often there are incidental findings that may not mean anything significant and are better discussed in person), you will not necessarily receive a call from us, but feel free to call in to check on the status of the report (since not knowing can be anxiety provoking) and the nurses will let you know the result or make sure I have nothing to pass on regarding the results first  Ideally, all of this will be discussed in detail in the follow-up visit

## 2022-06-06 NOTE — PROGRESS NOTES
[facility] Consult  PATIENT:  Isabel Baker  MRN:  [id number]  :  1990  DATE OF SERVICE:  2022  REFERRED BY: Carlos Rodriguez DO  PMD: Rui Jacobs DO    Assessment/Plan:     Isabel Baker is a very pleasant 27 y o  male with a past medical history that includes anxiety, bipolar depression, hyperlipidemia, BMI over 30 referred here for evaluation of headache  My initial evaluation 2022    Migraine without aura and without status migrainosus, not intractable  H/O concussion  Suspected sleep apnea  Chronic post-traumatic headache, not intractable  He reports he has not been formally diagnosed with a concussion but he estimates 2-3 a season for years  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, he recently retired from sport  We discussed certainly some absolutely sound like concussions, others posttraumatic headaches, regardless would be impossible to dissect retrospectively and determine exact number, especially with no concrete test for concussion yet  Most recently on 2022 he hit his head tackling and felt lightheaded afterwards  He is currently following with physical therapy and doing vision therapy  - as of 2022: 4-5 days a week for at least last 1 5 years headache, worse once every couple of months  He was diagnosed with bipolar disorder around  or  soon after graduating high school and we discussed I would defer to psychiatry and counseling for management of this, but exacerbation of known mood disorder would be most likely what is contributing to mood symptoms  He reports average is 68 hours of sleep, snores depending on position we discussed sleep apnea could be contributing to multiple symptoms as well and recommended sleep study  He reports mild working memory issues without safety issues      Workup:  - due to increased frequency and severity of headaches and migraines I recommend further evaluation with MRI brain with without contrast to rule out structural or treatable causes of symptoms    Preventative:  - we discussed headache hygiene and lifestyle factors that may improve headaches  - he is currently undergoing titration of lamotrigine therefore will not add another prescription headache preventative at this time  - Currently on through other providers:  currently undergoing titration of lamotrigine through psychiatry  - Past/ failed/contraindicated: Lithium in the past for mood, Amitriptyline and venlafaxine would be contraindicated due to interaction with meds and potential for pushing into lois  - future options:  Topiramate, propranolol, CGRP med, botox    Abortive:  - discussed not taking over-the-counter or prescription pain medications more than 3 days per week to prevent medication overuse/rebound headache  -     rizatriptan (MAXALT) 10 MG tablet; Take 1 tablet (10 mg total) by mouth once as needed for migraine May repeat in 2 hours if needed  Max 2/24 hours, 9/month  Discussed proper use, possible side effects and risks  - Currently on through other providers:  Hydroxyzine p r n  Anxiety, diclofenac, aspirin  - Past/ failed/contraindicated:  OTC meds  - future options: Alternative Triptan,  prochlorperazine, Toradol IM or p o , could consider trial for 5 days of Depakote or dexamethasone for prolonged migraine, ubrelvy, reyvow, nurtec      We have discussed concussions and the natural course of recovery  We have discussed that symptoms from a concussion typically take 1-2 weeks to resolve, and although sometimes it can feel like concussion symptoms linger on, at this point these symptoms would be related to contributing factors      - Contributing factors may include:   Post traumatic stress, Prolonged removal from normal routine,  posttraumatic headache,  comorbid injuries, preexisting chronic headaches or migraines, cervicogenic headache, medication overuse headache, anxiety or depression, stress, deconditioning,  comorbid medical diagnoses  - I have recommended gradual return of normal cognitive and physical activity with safety precautions  - We discussed that newer research regarding concussion shows that the sooner one returns gradually to their normal physical and cognitive routine, the sooner one tends to recover  Prolonged removal from normal routine and deconditioning have been shown to prolong symptoms and worsen symptoms   - We discussed that sometimes there is a constellation of symptoms that some refer to as "post concussion syndrome," but I prefer not to use this term since that can be misleading and make people think they are still brain injured or "concussed," when the most common and likely etiology this far out from the head trauma is either contributing factors or a form of functional neurologic disorder with mixed symptoms  - Safe driving precautions, should not drive at all if feeling sleepy or cognitively not well  - We discussed the most likely therapy to help in these cases would be counseling, continue to follow with them   - In regards to his concerns about CTE, we have discussed the fact that this is an evolving diagnosis and is still a post-mortem diagnosis  We discussed how cognitive issues can have multiple causes and often related to multifactorial etiologies including stress, anxiety,  mood, pain, hypervigilance  and sleep issues and that such issues would need to be addressed first to see what residual deficits remain  We discussed that is less likely cognitive dysfunction is related to concussion at this point and that ideally we should continue to focus on any areas that have current treatments  Suspected sleep apnea  -     Ambulatory referral to Sleep Medicine;  Future          Patient instructions        Additional Testing:   Neurodiagnostic workup: MRI Brain ordered  Sleep medicine referral for possible sleep apnea [phone]      Headache/migraine treatment:   Abortive medications (for immediate treatment of a headache): It is ok to take ibuprofen, acetaminophen or naproxen (Advil, Tylenol,  Aleve, Excedrin) if they help your headaches you should limit these to No more than 3 times a week to avoid medication overuse/rebound headaches  For your more moderate to severe migraines take this medication early   Maxalt (rizatriptan) 10mg tabs - take one at the onset of headache  May repeat one time after 1-2 hours if pain has not resolved  (Max 2 a day and 9 a month)       Over the counter preventive supplements for headaches/migraines (if you try, try for 3 months straight)  (to take every day to help prevent headaches - not to take at the time of headache): There are combo pills online of these - none of which regulated by FDA and double check dosing - take appropriate dose only once a day- preventa migraine, migravent, mind ease, migrelief   [x] Magnesium 400mg daily (If any diarrhea or upset stomach, decrease dose  as tolerated)  [] Riboflavin (Vitamin B2) 400mg daily - try online   (FYI B2 may make your urine bright/neon yellow)  AND/OR  [] Herbal medication: Petasites/Butterbur 150 mg daily - try online  (When choosing your Butterbur online or in the store, beware that there are some poor preps containing pyrrolizidine alkaloids (PAs) that can be harmful to the liver  Therefore, do not use butterbur products that are not labeled as PA-free )      Prescription preventive medications for headaches/migraines   (to take every day to help prevent headaches - not to take at the time of headache):  [x] no change to meds since currently having Lamictal titrated         Lifestyle Recommendations:  [x] SLEEP - Maintain a regular sleep schedule: Adults need at least 7-8 hours of uninterrupted a night   Maintain good sleep hygiene:  Going to bed and waking up at consistent times, avoiding excessive daytime naps, avoiding caffeinated beverages in the evening, avoid excessive stimulation in the evening and generally using bed primarily for sleeping  One hour before bedtime would recommend turning lights down lower, decreasing your activity (may read quietly, listen to music at a low volume)  When you get into bed, should eliminate all technology (no texting, emailing, playing with your phone, iPad or tablet in bed)  [x] HYDRATION - Maintain good hydration  Drink  2L of fluid a day (4 typical small water bottles)  [x] DIET - Maintain good nutrition  In particular don't skip meals and try and eat healthy balanced meals regularly  [x] TRIGGERS - Look for other triggers and avoid them: Limit caffeine to 1-2 cups a day or less  Avoid dietary triggers that you have noticed bring on your headaches (this could include aged cheese, peanuts, MSG, aspartame and nitrates)  [x] EXERCISE - physical exercise as we all know is good for you in many ways, and not only is good for your heart, but also is beneficial for your mental health, cognitive health and  chronic pain/headaches  I would encourage at the least 5 days of physical exercise weekly for at least 30 minutes  Education and Follow-up  [x] Please call with any questions or concerns  Of course if any new concerning symptoms go to the emergency department  [x] Follow up 3-4 months, sooner if needed      - As we discussed if there are no abnormalities on the brain MRI that need urgent intervention (very often there are incidental findings that may not mean anything significant and are better discussed in person), you will not necessarily receive a call from us, but feel free to call in to check on the status of the report (since not knowing can be anxiety provoking) and the nurses will let you know the result or make sure I have nothing to pass on regarding the results first   Ideally, all of this will be discussed in detail in the follow-up visit       CC:   We had the pleasure of evaluating Melba Javed in neurological consultation today  Melba Javed is a   right handed male who presents today for evaluation of headaches  History obtained from patient as well as available medical record review  History of Present Illness:   Current medical illnesses  or past medical history include anxiety, bipolar depression, hyperlipidemia, BMI over 27    Never been diagnosed officially with a concussion  He estimates 2-3 a season  [place] [employer]  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, prior to that in [employer] [employer] (grad in 2007)  - In 2019, he ran head into a wall with full pads on  Got up and felt like he was walking straight but was walking diagonal, felt like eyes were crossed  On 4/30/22, he went to tackle someone and grabbed him and went to ground and back of head hit the ground and then big guys body hit head and hit floor again   Acute symptoms included:  No LOC, no amnesia, blurred vision, headache increased  Tried to play one more series and was very lightheaded      05/11/2022 followed up with PCP  - non-contrast head CT 5/11/22: No acute intracranial abnormality      On 05/14/2022, playing football again and hit head again tackling   Acute symptoms included:  No LOC, no amnesia,  Lightheaded after     Finds PT helpful in that he is making advancements with vision therapy    - as of 6/6/2022:  He reports gradually improving symptoms     Mood  2008 or 2009 diagnosed with bipolar in the year or soon after HS  He does not think he necessary had this  He reports mood changes quickly from day to day   Saw psychiatry recently for anger and was started on lamictal 25 mg about a month ago, following up with psychiatry soon for increasing   Counselor for past 3 years, and [facility]     No driving safety issues, memory never a safety issue  When really angry, worse than road rage angry, feels off  Sometimes can lose time when angry   Does not know how he goes from 0-100   Will forget conversations    Sleep - averages: Normally 6-8 hours  Problems falling asleep?:   Yes, up to 2 hours, trouble shutting off brain   Problems staying asleep?:  No, wife says he snores depending on position       Headaches started at what age? 31- 28 years old  How often do the headaches occur?   - as of 6/6/2022: 4-5 days a week for at least last 1 5 years, worse once every couple of months   What time of the day do the headaches start? Wakes up with them typically  How long do the headaches last? Often go away with meds   Are you ever headache free? Yes    Aura? without aura     Last eye exam: 5/2022 - normal except for contacts    Where is your headache located and pain quality? Most of the time retro-orbital and temporal and mid occipital region   Always bilateral   Pressure, some days pulsating     What is the intensity of pain? Average: 5-6/10, worst 10/10  Associated symptoms:   [x] Nausea       [] Vomiting      [x] Photophobia     [x]Phonophobia      [] Osmophobia  [x] Blurred vision - when really bad   [x] Light-headed or dizzy - sometimes, not laying down typically   [] Tinnitus   [] Hands or feet tingle or feel numb/paresthesias    [] Ptosis      [] Facial droop  [] Lacrimation  [] Nasal congestion/rhinorrhea      Headache triggers:  Unknown     Have you seen someone else for headaches or pain? Yes, PCP   Have you ever had any Brain imaging? Yes     What medications do you take or have you taken for your headaches?    ABORTIVE:      Normally aspirin helps sometimes     Diclofenac helps   Hydroxyzine rarely takes for anxiety     PREVENTIVE:     Lamotrigine 25 mg (for about a month)      Past/ failed/contraindicated:  Lithium in the past   Amitriptyline and venlafaxine would be contraindicated due to interaction with meds       Water: over 90 oz per day  Caffeine: 1 cup per day and sometimes second       The following portions of the patient's history were reviewed and updated as appropriate: allergies, current medications, past family history, past medical history, past social history, past surgical history and problem list       Pertinent family history:  Family history of headaches:  no known family members with significant headaches  Any family history of aneurysms - No  Mom and brother have something psychiatric     Pertinent social history:  Work: manager [employer]   Education: bachelors   Lives with wife, 3 kids    Paternal grandpa ( in his 46s) and paternal great grandmothers (prob in early 40I)    Illicit Drugs: denies  Alcohol/tobacco: chewing tobacco, no alcohol - quit in  and then one drink 1     Past Medical History:     Past Medical History:   Diagnosis Date    COVID-19 2021       Patient Active Problem List   Diagnosis    Obesity (BMI 30-39  9)    Mixed hyperlipidemia    Bipolar depression (HCC)    Urine protein increased    Health care maintenance    VENU (generalized anxiety disorder)       Medications:      Current Outpatient Medications   Medication Sig Dispense Refill    diclofenac potassium (CATAFLAM) 50 mg tablet Take 1 tablet every 8 hours as needed for headache 30 tablet 0    hydrOXYzine HCL (ATARAX) 25 mg tablet Take 25-50 mg by mouth every 6 (six) hours as needed      lamoTRIgine (LaMICtal) 25 mg tablet Take 25 mg by mouth in the morning   Magnesium Gluconate (MAGNESIUM 27 PO) Take by mouth as needed      rizatriptan (MAXALT) 10 MG tablet Take 1 tablet (10 mg total) by mouth once as needed for migraine May repeat in 2 hours if needed  Max 2/24 hours, 9/month  9 tablet 6     No current facility-administered medications for this visit  Allergies:       Allergies   Allergen Reactions    Penicillins Hives       Family History:     Family History   Problem Relation Age of Onset    Bipolar disorder Mother        Social History:       Social History     Socioeconomic History    Marital status: /Civil Union     Spouse name: Not on file    Number of children: Not on file    Years of education: Not on file    Highest education level: Not on file   Occupational History    Not on file   Tobacco Use    Smoking status: Never Smoker    Smokeless tobacco: Current User     Types: Chew   Vaping Use    Vaping Use: Never used   Substance and Sexual Activity    Alcohol use: Not Currently     Comment: per pt he is not drinking    Drug use: No    Sexual activity: Not on file     Comment: s/p Vasectomy   Other Topics Concern    Not on file   Social History Narrative    Not on file     Social Determinants of Health     Financial Resource Strain: Not on file   Food Insecurity: Not on file   Transportation Needs: Not on file   Physical Activity: Not on file   Stress: Not on file   Social Connections: Not on file   Intimate Partner Violence: Not on file   Housing Stability: Not on file         Objective:     Physical Exam:                                                                 Vitals:            Constitutional:    /100 (BP Location: Right arm, Patient Position: Sitting, Cuff Size: Standard)   Pulse 86   Ht 6' 1" (1 854 m)   Wt 105 kg (231 lb)   BMI 30 48 kg/m²   BP Readings from Last 3 Encounters:   06/06/22 149/100   06/02/22 120/90   05/11/22 130/84     Pulse Readings from Last 3 Encounters:   06/06/22 86   05/11/22 80   05/08/22 (!) 108         Well developed, well nourished, well groomed  No dysmorphic features  Psychiatric:  Normal behavior and appropriate affect        Neurological Examination:     Mental status/cognitive function:   Orientated to time, place and person  Recent and remote memory intact  Attention span and concentration as well as fund of knowledge are appropriate for age  Normal language and spontaneous speech  Cranial Nerves:  II-visual fields full  Fundi poorly visualized due to pupillary constriction  III, IV, VI-Pupils were equal, round, and reactive to light and accomodation   Extraocular movements were full and conjugate without nystagmus  V-facial sensation symmetric  VII-facial expression symmetric, intact forehead wrinkle, strong eye closure, symmetric smile    VIII-hearing grossly intact bilaterally   IX, X-palate elevation symmetric, no dysarthria  XI-shoulder shrug strength intact    XII-tongue protrusion midline  Motor Exam: symmetric bulk and tone throughout, no pronator drift  Power/strength 5/5 bilateral upper and lower extremities, no atrophy, fasciculations or abnormal movements noted  Sensory: grossly intact light touch in all extremities  Reflexes: brachioradialis 2+, biceps 2+, knee 2+, ankle 2+ bilaterally  No ankle clonus  Coordination: Finger nose finger intact bilaterally, no apparent dysmetria, ataxia or tremor noted  Gait: steady casual and tandem gait  Pertinent lab results:   - 5/11/22: CMP and CBC unremarkable  HIV and RPR NR  Heavy metals  Vit B12 351  TSH 1 843  Lyme ab total normal       Imaging: I have personally reviewed imaging and radiology read   - non-contrast head CT 5/11/22: No acute intracranial abnormality  Review of Systems:   ROS obtained by medical assistant Personally reviewed and updated if indicated  I recommended PCP follow up for non neurologic problems  Review of Systems   Constitutional: Negative for appetite change and fever  HENT: Negative  Negative for hearing loss, tinnitus, trouble swallowing and voice change  Eyes: Negative  Negative for photophobia and pain  Respiratory: Negative  Negative for shortness of breath  Cardiovascular: Negative  Negative for palpitations  Gastrointestinal: Negative  Negative for nausea and vomiting  Endocrine: Negative  Negative for cold intolerance  Genitourinary: Negative  Negative for dysuria, frequency and urgency  Musculoskeletal: Negative  Negative for myalgias and neck pain  Skin: Negative  Negative for rash  Neurological: Positive for headaches  Negative for dizziness, tremors, seizures, syncope, facial asymmetry, speech difficulty, weakness, light-headedness and numbness  Hematological: Negative  Does not bruise/bleed easily  Psychiatric/Behavioral: Negative  Negative for confusion, hallucinations and sleep disturbance  All other systems reviewed and are negative  I have spent 63 minutes with Patient today in which greater than 50% of this time was spent in counseling/coordination of care regarding Diagnostic results, Prognosis, Risks and benefits of tx options, Intructions for management, Patient education, Importance of tx compliance, Risk factor reductions and Impressions  I also spent 20 minutes non face to face for this patient the same day           Author:  Saurabh Dobbs MD 6/6/2022 5:34 PM

## 2022-06-06 NOTE — PROGRESS NOTES
Review of Systems   Constitutional: Negative for appetite change and fever  HENT: Negative  Negative for hearing loss, tinnitus, trouble swallowing and voice change  Eyes: Negative  Negative for photophobia and pain  Respiratory: Negative  Negative for shortness of breath  Cardiovascular: Negative  Negative for palpitations  Gastrointestinal: Negative  Negative for nausea and vomiting  Endocrine: Negative  Negative for cold intolerance  Genitourinary: Negative  Negative for dysuria, frequency and urgency  Musculoskeletal: Negative  Negative for myalgias and neck pain  Skin: Negative  Negative for rash  Neurological: Positive for headaches  Negative for dizziness, tremors, seizures, syncope, facial asymmetry, speech difficulty, weakness, light-headedness and numbness  Hematological: Negative  Does not bruise/bleed easily  Psychiatric/Behavioral: Negative  Negative for confusion, hallucinations and sleep disturbance  All other systems reviewed and are negative

## 2022-06-16 ENCOUNTER — APPOINTMENT (OUTPATIENT)
Dept: PHYSICAL THERAPY | Facility: REHABILITATION | Age: 32
End: 2022-06-16
Payer: COMMERCIAL

## 2022-06-16 NOTE — PROGRESS NOTES
DISCHARGE SUMMARY    Pt discharged due to over 3 consecutive now shows  No return call to reschedule   May return with new script for PT

## 2022-06-20 ENCOUNTER — APPOINTMENT (OUTPATIENT)
Dept: PHYSICAL THERAPY | Facility: REHABILITATION | Age: 32
End: 2022-06-20
Payer: COMMERCIAL

## 2022-06-23 ENCOUNTER — APPOINTMENT (OUTPATIENT)
Dept: PHYSICAL THERAPY | Facility: REHABILITATION | Age: 32
End: 2022-06-23
Payer: COMMERCIAL

## 2022-06-24 ENCOUNTER — APPOINTMENT (OUTPATIENT)
Dept: PHYSICAL THERAPY | Facility: REHABILITATION | Age: 32
End: 2022-06-24
Payer: COMMERCIAL

## 2022-09-22 ENCOUNTER — OFFICE VISIT (OUTPATIENT)
Dept: FAMILY MEDICINE CLINIC | Facility: CLINIC | Age: 32
End: 2022-09-22
Payer: COMMERCIAL

## 2022-09-22 ENCOUNTER — HOSPITAL ENCOUNTER (OUTPATIENT)
Dept: CT IMAGING | Facility: HOSPITAL | Age: 32
End: 2022-09-22
Payer: COMMERCIAL

## 2022-09-22 VITALS
SYSTOLIC BLOOD PRESSURE: 142 MMHG | BODY MASS INDEX: 30.27 KG/M2 | RESPIRATION RATE: 16 BRPM | WEIGHT: 228.4 LBS | HEIGHT: 73 IN | TEMPERATURE: 98.2 F | HEART RATE: 68 BPM | OXYGEN SATURATION: 98 % | DIASTOLIC BLOOD PRESSURE: 92 MMHG

## 2022-09-22 DIAGNOSIS — R19.7 DIARRHEA OF PRESUMED INFECTIOUS ORIGIN: ICD-10-CM

## 2022-09-22 DIAGNOSIS — K52.9 COLITIS: Primary | ICD-10-CM

## 2022-09-22 DIAGNOSIS — R10.32 LLQ PAIN: Primary | ICD-10-CM

## 2022-09-22 DIAGNOSIS — E78.2 MIXED HYPERLIPIDEMIA: ICD-10-CM

## 2022-09-22 DIAGNOSIS — R10.32 LLQ PAIN: ICD-10-CM

## 2022-09-22 PROCEDURE — 99214 OFFICE O/P EST MOD 30 MIN: CPT | Performed by: NURSE PRACTITIONER

## 2022-09-22 PROCEDURE — 74177 CT ABD & PELVIS W/CONTRAST: CPT

## 2022-09-22 PROCEDURE — G1004 CDSM NDSC: HCPCS

## 2022-09-22 RX ORDER — CIPROFLOXACIN 500 MG/1
500 TABLET, FILM COATED ORAL EVERY 12 HOURS SCHEDULED
Qty: 14 TABLET | Refills: 0 | Status: SHIPPED | OUTPATIENT
Start: 2022-09-22 | End: 2022-09-29

## 2022-09-22 RX ORDER — LOPERAMIDE HYDROCHLORIDE 2 MG/1
2 CAPSULE ORAL 4 TIMES DAILY PRN
Qty: 30 CAPSULE | Refills: 0 | Status: SHIPPED | OUTPATIENT
Start: 2022-09-22

## 2022-09-22 RX ADMIN — IOHEXOL 100 ML: 350 INJECTION, SOLUTION INTRAVENOUS at 15:30

## 2022-09-22 NOTE — ASSESSMENT & PLAN NOTE
Due to recurrent diarrhea and left lower quadrant tenderness and pain stat CT of the abdomen was ordered to rule out diverticulitis  For the time being, the patient was advised to follow the brat diet and increase his water intake to prevent dehydration  If diverticulitis is present patient will be prescribed appropriate antibiotics

## 2022-09-22 NOTE — ASSESSMENT & PLAN NOTE
Patient was advised to continue to limit fatty and fried foods in his diet  Patient does have repeat lipid panel ordered to be drawn prior to his next office visit

## 2022-09-22 NOTE — PROGRESS NOTES
Assessment and Plan:    Problem List Items Addressed This Visit        Other    Mixed hyperlipidemia     Patient was advised to continue to limit fatty and fried foods in his diet  Patient does have repeat lipid panel ordered to be drawn prior to his next office visit  LLQ pain - Primary     Due to recurrent diarrhea and left lower quadrant tenderness and pain stat CT of the abdomen was ordered to rule out diverticulitis  For the time being, the patient was advised to follow the brat diet and increase his water intake to prevent dehydration  If diverticulitis is present patient will be prescribed appropriate antibiotics  Relevant Orders    CT abdomen pelvis w contrast      Other Visit Diagnoses     Diarrhea of presumed infectious origin        Relevant Orders    CT abdomen pelvis w contrast                 Diagnoses and all orders for this visit:    LLQ pain  -     CT abdomen pelvis w contrast; Future    Diarrhea of presumed infectious origin  -     CT abdomen pelvis w contrast; Future    Mixed hyperlipidemia            Subjective:      Patient ID: Lashanda Ybarra is a 34 y o  male  CC:    Chief Complaint   Patient presents with    Diarrhea     Pt here with concerns he has diarrhea x 4 days and abdominal pain  Rcruz       HPI:    Diarrhea:  Patient reports over the past 4 days he has been experiencing recurrent diarrhea  He reports over the past 24 hours he has been having diarrhea about every 30 minutes  He reports that eating seems to sometimes cause more diarrhea but at other times not affect his symptoms  He denies fevers, chills, nausea, or vomiting  He reports he is unsure if there has been blood in his stool  He does report the pain is localized to his lower abdomen and whenever he has a BM the pain localized to his LLQ  Hyperlipidemia:  Patient's most recent lipid panel showed elevated total cholesterol, triglycerides, and LDL    Patient is not currently taking cholesterol LM for pt to call back medication  The following portions of the patient's history were reviewed and updated as appropriate: allergies, current medications, past family history, past medical history, past social history, past surgical history and problem list       Review of Systems   Constitutional: Negative for chills and fever  HENT: Negative for ear pain and sore throat  Eyes: Negative for pain and visual disturbance  Respiratory: Negative for cough, chest tightness, shortness of breath and wheezing  Cardiovascular: Negative for chest pain, palpitations and leg swelling  Gastrointestinal: Positive for abdominal pain (LLQ) and diarrhea  Negative for abdominal distention, anal bleeding, blood in stool, constipation, nausea, rectal pain and vomiting  Endocrine: Negative for cold intolerance and heat intolerance  Genitourinary: Negative for decreased urine volume, dysuria and hematuria  Musculoskeletal: Negative for arthralgias, back pain and myalgias  Skin: Negative for color change and rash  Allergic/Immunologic: Negative for environmental allergies  Neurological: Negative for dizziness, seizures, syncope, weakness, light-headedness, numbness and headaches  Hematological: Negative for adenopathy  Psychiatric/Behavioral: Negative for confusion  The patient is not nervous/anxious  All other systems reviewed and are negative  Data to review:       Objective:    Vitals:    09/22/22 1422   BP: 142/92   BP Location: Right arm   Patient Position: Sitting   Cuff Size: Large   Pulse: 68   Resp: 16   Temp: 98 2 °F (36 8 °C)   TempSrc: Tympanic Core   SpO2: 98%   Weight: 104 kg (228 lb 6 4 oz)   Height: 6' 1" (1 854 m)        Physical Exam  Vitals and nursing note reviewed  Constitutional:       General: He is not in acute distress  Appearance: Normal appearance  He is well-developed  He is not ill-appearing  HENT:      Head: Normocephalic and atraumatic     Eyes:      Conjunctiva/sclera: Conjunctivae normal    Cardiovascular:      Rate and Rhythm: Normal rate and regular rhythm  Pulses: Normal pulses  Carotid pulses are 2+ on the right side and 2+ on the left side  Radial pulses are 2+ on the right side and 2+ on the left side  Posterior tibial pulses are 2+ on the right side and 2+ on the left side  Heart sounds: Normal heart sounds  No murmur heard  Pulmonary:      Effort: Pulmonary effort is normal  No respiratory distress  Breath sounds: Normal breath sounds  No wheezing or rhonchi  Abdominal:      General: Abdomen is flat  Bowel sounds are normal  There is no distension  Palpations: Abdomen is soft  Tenderness: There is abdominal tenderness in the right lower quadrant and left lower quadrant  There is no guarding  Musculoskeletal:         General: Normal range of motion  Cervical back: Normal range of motion and neck supple  Right lower leg: No edema  Left lower leg: No edema  Skin:     General: Skin is warm and dry  Capillary Refill: Capillary refill takes less than 2 seconds  Neurological:      General: No focal deficit present  Mental Status: He is alert and oriented to person, place, and time  Psychiatric:         Mood and Affect: Mood normal          Behavior: Behavior normal          Thought Content: Thought content normal          Judgment: Judgment normal              Tobacco Cessation Counseling: Tobacco cessation counseling was provided   The patient is sincerely urged to quit consumption of tobacco  He is not ready to quit tobacco

## 2022-09-22 NOTE — PATIENT INSTRUCTIONS
Nutrition Tips for Relief of Diarrhea   WHAT YOU NEED TO KNOW:   There are diet changes you can make to help relieve or stop diarrhea  These changes include limiting or avoiding foods and liquids that are high in sugar, fat, fiber, and lactose  Lactose is a sugar found in milk products  Milk products can cause diarrhea in people who are lactose intolerant  You should also drink extra liquids to replace fluids that are lost when you have diarrhea  Diarrhea can lead to dehydration  DISCHARGE INSTRUCTIONS:   Foods to limit or avoid:   Dairy:       Whole milk     Half-and-half, cream, and sour cream     Regular (whole milk) ice cream     Grains:       Whole wheat and whole grain breads, pasta, cereals, and crackers     Brown and wild rice     Breads and cereals with seeds or nuts     Popcorn     Fruit and vegetables: All raw fruits, except bananas and melon     Dried fruits, including prunes and raisins     Canned fruit in heavy syrup     Prune juice and any fruit juice with pulp     Raw vegetables, except lettuce      Fried vegetables     Corn, raw and cooked broccoli, cabbage, cauliflower, and aleksandra greens     Protein:       Fried meat, poultry, and fish     High-fat luncheon meats, such as bologna     Fatty meats, such as sausage, lance, and hot dogs     Beans and nuts     Liquids:       Sodas and fruit-flavored drinks     Drinks that contain caffeine, such as energy drinks, coffee, and tea      Drinks that contain alcohol or sugar alcohol, such as sorbitol     Foods and liquids you may eat or drink:  Most people can tolerate the foods and liquids listed below  If any of them make your symptoms worse, stop eating or drinking them until you feel better  If you are lactose intolerant, avoid milk products    Dairy:       Skim or low-fat milk or evaporated milk     Soy milk or buttermilk      Low-fat, part-skim, and aged cheese     Yogurt, low-fat ice cream, or sherbert     Grains:  (Choose foods with less than 2 grams of dietary fiber per serving )      White or refined flour breads, bagels, pasta, and crackers     Cold or hot cereals made from white or refined flour such as puffed rice, cornflakes, or cream of wheat     White rice     Fruit and vegetables:       Bananas or melon     Fruit juice without pulp, except prune juice     Canned fruit in juice or light syrup     Lettuce and most well-cooked vegetables without seeds or skins      Strained vegetable juice     Protein:       Tender, well-cooked meat, poultry, or fish     Well-cooked eggs or soy foods (cooked without added fat)     Smooth nut butters     Fats:  (Limit fats to less than 8 teaspoons a day)      Oil, butter, or margarine, or mayonnaise     Cream cheese or salad dressings     Liquids: For infants, breast milk or formula     Oral rehydration solution      Decaffeinated coffee or caffeine-free teas     Soft drinks without caffeine     Other guidelines to follow:   Drink liquids as directed  You may need to drink more liquids than usual to prevent dehydration  Ask how much liquid to drink each day and which liquids are best for you  You may need to drink an oral rehydration solution (ORS)  An ORS helps replace fluids and electrolytes that you lose when you have diarrhea  Eat small meals or snacks every 3 to 4 hours  instead of large meals  Continue eating even if you still have diarrhea  Your diarrhea will continue for a few days but should gradually go away  © Copyright [employer] 2022 Information is for End User's use only and may not be sold, redistributed or otherwise used for commercial purposes  All illustrations and images included in CareNotes® are the copyrighted property of [employer]  or [facility] Ajit Keith   The above information is an  only  It is not intended as medical advice for individual conditions or treatments   Talk to your doctor, nurse or pharmacist before following any medical regimen to see if it is safe and effective for you

## 2022-09-26 ENCOUNTER — OFFICE VISIT (OUTPATIENT)
Dept: GASTROENTEROLOGY | Facility: CLINIC | Age: 32
End: 2022-09-26
Payer: COMMERCIAL

## 2022-09-26 VITALS
OXYGEN SATURATION: 98 % | SYSTOLIC BLOOD PRESSURE: 138 MMHG | HEIGHT: 73 IN | HEART RATE: 88 BPM | DIASTOLIC BLOOD PRESSURE: 94 MMHG | BODY MASS INDEX: 30.48 KG/M2 | WEIGHT: 230 LBS

## 2022-09-26 DIAGNOSIS — K52.9 COLITIS: ICD-10-CM

## 2022-09-26 DIAGNOSIS — K52.9 COLITIS: Primary | ICD-10-CM

## 2022-09-26 PROCEDURE — 99203 OFFICE O/P NEW LOW 30 MIN: CPT | Performed by: PHYSICIAN ASSISTANT

## 2022-09-26 NOTE — PROGRESS NOTES
[facility] - Outpatient Consultation  Zechariah Friday 34 y o  male MRN: [id number]  Encounter: [id number]          ASSESSMENT AND PLAN:      1  Colitis  Infectious colitis  Improving over the weekend on Cipro  No rectal bleeding, severe pain, intractable vomiting  As long as symptoms continue to improve no colonoscopy is necessary  I explained that if symptoms return we will plan stool testing and probable colonoscopy  He notes his maternal uncle has Crohns    ______________________________________________________________________    HPI:  27-year-old male presents for evaluation of colitis  He notes that 2 weeks ago he when out to eat with his wife  They had a seafood bake which is something that they do not normally eat  He reports that he woke up the next morning with diarrhea  He notes that this happen for several days but was mild in only about once a day  By Wednesday of last week he was having profuse watery diarrhea  He reports that this continued into Thursday and so he saw his family doctor who ordered CT scan  The CT scan which was performed on September 22nd showed diffuse edema of the transverse and left colon to the level of the rectum  After doing this finding he called and Cipro for the patient to take daily for 7 days  The patient reports that since starting the antibiotic he has been feeling much better  He notes that he has actually not had a bowel movement since Friday  He admits that over the weekend he was eating a very bland diet  He did have a regular dinner last night and still feels well  With his symptoms he had no severe abdominal pain, rectal bleeding, intractable vomiting, fevers or chills  He notes that his wife did not get sick  He denies any antibiotics prior to symptom onset or travel  He denies any personal history of severe stomach issues  He admits that his maternal uncle has Crohn's disease        REVIEW OF SYSTEMS:    CONSTITUTIONAL: Denies any fever, chills, rigors, and weight loss  HEENT: No earache or tinnitus  Denies hearing loss or visual disturbances  CARDIOVASCULAR: No chest pain or palpitations  RESPIRATORY: Denies any cough, hemoptysis, shortness of breath or dyspnea on exertion  GASTROINTESTINAL: As noted in the History of Present Illness  GENITOURINARY: No problems with urination  Denies any hematuria or dysuria  NEUROLOGIC: No dizziness or vertigo, denies headaches  MUSCULOSKELETAL: Denies any muscle or joint pain  SKIN: Denies skin rashes or itching  ENDOCRINE: Denies excessive thirst  Denies intolerance to heat or cold  PSYCHOSOCIAL: Denies depression or anxiety  Denies any recent memory loss  Historical Information   Past Medical History:   Diagnosis Date    COVID-19 5/18/2021     Past Surgical History:   Procedure Laterality Date    SHOULDER SURGERY      VASECTOMY  2018     Social History   Social History     Substance and Sexual Activity   Alcohol Use Not Currently    Comment: per pt he is not drinking     Social History     Substance and Sexual Activity   Drug Use No     Social History     Tobacco Use   Smoking Status Never Smoker   Smokeless Tobacco Current User    Types: Chew     Family History   Problem Relation Age of Onset    Bipolar disorder Mother     Addiction problem Father     Hyperlipidemia Father     Hypertension Father        Meds/Allergies       Current Outpatient Medications:     ciprofloxacin (CIPRO) 500 mg tablet    diclofenac potassium (CATAFLAM) 50 mg tablet    hydrOXYzine HCL (ATARAX) 25 mg tablet    lamoTRIgine (LaMICtal) 25 mg tablet    loperamide (IMODIUM) 2 mg capsule    Magnesium Gluconate (MAGNESIUM 27 PO)    rizatriptan (MAXALT) 10 MG tablet    Allergies   Allergen Reactions    Penicillins Hives           Objective     Blood pressure 138/94, pulse 88, height 6' 1" (1 854 m), weight 104 kg (230 lb), SpO2 98 %   Body mass index is 30 34 kg/m²         PHYSICAL EXAM:      General Appearance:   Alert, cooperative, no distress   HEENT:   Normocephalic, atraumatic, anicteric      Neck:  Supple, symmetrical, trachea midline   Lungs:   Clear to auscultation bilaterally; no rales, rhonchi or wheezing; respirations unlabored    Heart[de-identified]   Regular rate and rhythm; no murmur, rub, or gallop  Abdomen:   Soft, non-tender, non-distended; normal bowel sounds; no masses, no organomegaly    Genitalia:   Deferred    Rectal:   Deferred    Extremities:  No cyanosis, clubbing or edema    Pulses:  2+ and symmetric    Skin:  No jaundice, rashes, or lesions    Lymph nodes:  No palpable cervical lymphadenopathy        Lab Results:   No visits with results within 1 Day(s) from this visit     Latest known visit with results is:   Appointment on 05/11/2022   Component Date Value    WBC 05/11/2022 9 19     RBC 05/11/2022 5 16     Hemoglobin 05/11/2022 15 4     Hematocrit 05/11/2022 47 4     MCV 05/11/2022 92     MCH 05/11/2022 29 8     MCHC 05/11/2022 32 5     RDW 05/11/2022 11 9     Platelets 27/09/7284 257     MPV 05/11/2022 11 2     Sodium 05/11/2022 140     Potassium 05/11/2022 4 2     Chloride 05/11/2022 103     CO2 05/11/2022 29     ANION GAP 05/11/2022 8     BUN 05/11/2022 10     Creatinine 05/11/2022 0 98     Glucose 05/11/2022 88     Calcium 05/11/2022 8 9     AST 05/11/2022 22     ALT 05/11/2022 48     Alkaline Phosphatase 05/11/2022 56     Total Protein 05/11/2022 8 1     Albumin 05/11/2022 4 2     Total Bilirubin 05/11/2022 0 82     eGFR 05/11/2022 101     Cholesterol 05/11/2022 227 (A)    Triglycerides 05/11/2022 260 (A)    HDL, Direct 05/11/2022 31 (A)    LDL Calculated 05/11/2022 144 (A)    TSH 3RD GENERATON 05/11/2022 1 843     Color, UA 05/11/2022 Light Yellow     Clarity, UA 05/11/2022 Clear     Specific Gravity, UA 05/11/2022 1 015     pH, UA 05/11/2022 7 5     Leukocytes, UA 05/11/2022 Negative     Nitrite, UA 05/11/2022 Negative     Protein, UA 05/11/2022 Negative     Glucose, UA 05/11/2022 Negative     Ketones, UA 05/11/2022 Negative     Urobilinogen, UA 05/11/2022 1 0     Bilirubin, UA 05/11/2022 Negative     Occult Blood, UA 05/11/2022 Negative     Vitamin B-12 05/11/2022 351     Folate 05/11/2022 18 9 (A)    Lyme total antibody 05/11/2022 15     RPR 05/11/2022 Non-Reactive     Arsenic 05/11/2022 1     Cadmium 05/11/2022 <0 5     Mercury 05/11/2022 <1 0     Lead Blood 05/11/2022 <1     Hepatitis C Ab 05/11/2022 Non-reactive     HIV-1/HIV-2 Ab 05/11/2022 Non-Reactive          Radiology Results:   CT abdomen pelvis w contrast    Result Date: 9/22/2022  Narrative: CT ABDOMEN AND PELVIS WITH IV CONTRAST INDICATION:   R10 32: Left lower quadrant pain R19 7: Diarrhea, unspecified  Left lower quadrant pain with diarrhea possible diverticulitis COMPARISON:  None  TECHNIQUE:  CT examination of the abdomen and pelvis was performed  Axial, sagittal, and coronal 2D reformatted images were created from the source data and submitted for interpretation  Radiation dose length product (DLP) for this visit:  773 mGy-cm   This examination, like all CT scans performed in the [facility], was performed utilizing techniques to minimize radiation dose exposure, including the use of iterative reconstruction and automated exposure control  IV Contrast:  100 mL of iohexol (OMNIPAQUE) Enteric Contrast:  Enteric contrast was not administered  FINDINGS: ABDOMEN LOWER CHEST:  No clinically significant abnormality identified in the visualized lower chest  LIVER/BILIARY TREE:  Unremarkable  GALLBLADDER:  Equivocal gallstones are noted inferiorly in the coronal reconstruction versus asymmetric enhancement of the wall  SPLEEN:  Unremarkable  PANCREAS:  Unremarkable  ADRENAL GLANDS:  Unremarkable  KIDNEYS/URETERS:  Unremarkable  No hydronephrosis  STOMACH AND BOWEL:  Stomach and small bowel are unremarkable    The terminal ileum wall does not appear thickened  There is diffuse edema involving the rectum and left colon  The colon lumen is collapsed and the margin of the colon is slightly hazy  Edema extends into the transverse colon but relatively spares the ascending colon  No significant diverticulosis is identified  APPENDIX:  A normal appendix was visualized  ABDOMINOPELVIC CAVITY:  No ascites  No pneumoperitoneum  No significant retroperitoneal or pelvic adenopathy  A a few small mesenteric nodes are identified  VESSELS:  Unremarkable for patient's age  PELVIS REPRODUCTIVE ORGANS:  Unremarkable for patient's age  URINARY BLADDER:  Unremarkable  ABDOMINAL WALL/INGUINAL REGIONS:  Tiny periumbilical hernia of fat is identified  OSSEOUS STRUCTURES:  No acute fracture or destructive osseous lesion  Impression: Diffuse edema involves the transverse and left colon to the level of the rectum is suspicious for colitis  The small bowel is unremarkable including the terminal ileum  No evidence of free air or ascites  Question small gallstones versus some mild asymmetric enhancement of the gallbladder wall  Follow-up GI evaluation is advised  This was discussed with Nicola Guardado at 4:00 PM Workstation performed: [id number]   Answers for HPI/ROS submitted by the patient on 9/26/2022  Chronicity: new  Onset: 1 to 4 weeks ago  Onset quality: gradual  Frequency: 2 to 4 times per day  Episode duration: 1 hours  Progression since onset: waxing and waning  Pain location: LLQ, left flank  Pain - numeric: 7/10  Pain quality: cramping, a sensation of fullness, sharp  Radiates to: LLQ, periumbilical region, pelvis, perineum  anorexia: Yes  arthralgias: Yes  belching: No  constipation: No  diarrhea: Yes  dysuria: No  fever: No  flatus: Yes  frequency: Yes  headaches: Yes  hematochezia: No  hematuria: No  melena: No  myalgias: Yes  nausea:  No  weight loss: No  vomiting: No  Aggravated by: bowel movement, certain positions  Relieved by: nothing  Diagnostic workup: CT scan

## 2022-12-01 ENCOUNTER — TELEPHONE (OUTPATIENT)
Dept: NEUROLOGY | Facility: CLINIC | Age: 32
End: 2022-12-01

## 2022-12-01 NOTE — TELEPHONE ENCOUNTER
Called and spoke to patient to confirm their upcoming appointment with Dr Papi Medrano  Informed patient about arriving in the [place] location 15 minutes prior to their appointment to get checked in and going over chart

## 2022-12-14 ENCOUNTER — TELEPHONE (OUTPATIENT)
Dept: FAMILY MEDICINE CLINIC | Facility: CLINIC | Age: 32
End: 2022-12-14

## 2022-12-14 NOTE — TELEPHONE ENCOUNTER
Patient did not show for scheduled appointment today, 12/14/2022    Please call patient have her complete blood work that was ordered and make follow-up the next few weeks

## 2022-12-15 NOTE — TELEPHONE ENCOUNTER
Patient was reschedule for 12/28/22 at 12:50 with Dr Bingham and he will have he blood work done before his appointment

## 2022-12-28 ENCOUNTER — OFFICE VISIT (OUTPATIENT)
Dept: FAMILY MEDICINE CLINIC | Facility: CLINIC | Age: 32
End: 2022-12-28

## 2022-12-28 VITALS
DIASTOLIC BLOOD PRESSURE: 86 MMHG | BODY MASS INDEX: 31.41 KG/M2 | SYSTOLIC BLOOD PRESSURE: 134 MMHG | WEIGHT: 237 LBS | TEMPERATURE: 97.7 F | HEART RATE: 82 BPM | HEIGHT: 73 IN

## 2022-12-28 DIAGNOSIS — F41.1 GAD (GENERALIZED ANXIETY DISORDER): ICD-10-CM

## 2022-12-28 DIAGNOSIS — F31.9 BIPOLAR DEPRESSION (HCC): ICD-10-CM

## 2022-12-28 DIAGNOSIS — G43.009 MIGRAINE WITHOUT AURA AND WITHOUT STATUS MIGRAINOSUS, NOT INTRACTABLE: ICD-10-CM

## 2022-12-28 DIAGNOSIS — R26.89 BALANCE PROBLEM: ICD-10-CM

## 2022-12-28 DIAGNOSIS — S09.90XD TRAUMATIC INJURY OF HEAD, SUBSEQUENT ENCOUNTER: ICD-10-CM

## 2022-12-28 DIAGNOSIS — Z00.00 HEALTH CARE MAINTENANCE: ICD-10-CM

## 2022-12-28 DIAGNOSIS — S06.0X0A CONCUSSION WITHOUT LOSS OF CONSCIOUSNESS, INITIAL ENCOUNTER: ICD-10-CM

## 2022-12-28 DIAGNOSIS — R80.9 URINE PROTEIN INCREASED: ICD-10-CM

## 2022-12-28 DIAGNOSIS — E78.2 MIXED HYPERLIPIDEMIA: ICD-10-CM

## 2022-12-28 DIAGNOSIS — G44.319 ACUTE POST-TRAUMATIC HEADACHE, NOT INTRACTABLE: ICD-10-CM

## 2022-12-28 DIAGNOSIS — K52.9 COLITIS: Primary | ICD-10-CM

## 2022-12-28 DIAGNOSIS — E66.9 OBESITY (BMI 30-39.9): ICD-10-CM

## 2022-12-28 DIAGNOSIS — H53.9 VISION ABNORMALITIES: ICD-10-CM

## 2022-12-28 RX ORDER — LAMOTRIGINE 100 MG/1
TABLET ORAL
COMMUNITY
Start: 2022-12-21

## 2022-12-28 NOTE — PROGRESS NOTES
Name: Amalia Carbone      : 1990      MRN: [id number]  Encounter Provider: Los Sumner DO  Encounter Date: 2022   Encounter department: [facility] PRIMARY CARE    Assessment & Plan     1  Colitis, much improved follow-up Gastroenterology  2  Migraine headache with posttraumatic headache/concussion/vision abnormality/mild home status post evaluation by Neurology doing much better, patient follow with Neurology  3  Urine protein increase, UA was normal, this is resolved  4  Hyperlipidemia patient will complete blood work  5  Healthcare maintenance, influenza vaccine refused  6  GA D/bipolar, stable follows with Psychiatry  7  BMI 31 27, low-fat diet recommended  8  Return in 1 year sooner for office visit blood work sooner if needed       1  Colitis  -     CBC; Future; Expected date: 2023  -     Comprehensive metabolic panel; Future; Expected date: 2023  -     Lipid Panel with Direct LDL reflex; Future; Expected date: 2023  -     TSH, 3rd generation with Free T4 reflex; Future; Expected date: 2023  -     UA (URINE) with reflex to Scope; Future; Expected date: 2023    2  Migraine without aura and without status migrainosus, not intractable  -     CBC; Future; Expected date: 2023  -     Comprehensive metabolic panel; Future; Expected date: 2023  -     Lipid Panel with Direct LDL reflex; Future; Expected date: 2023  -     TSH, 3rd generation with Free T4 reflex; Future; Expected date: 2023  -     UA (URINE) with reflex to Scope; Future; Expected date: 2023    3  Urine protein increased  Assessment & Plan:  Resolved on last urinalysis    Orders:  -     CBC; Future; Expected date: 2023  -     Comprehensive metabolic panel; Future; Expected date: 2023  -     Lipid Panel with Direct LDL reflex; Future; Expected date: 2023  -     TSH, 3rd generation with Free T4 reflex;  Future; Expected date: 2023  -     UA (URINE) with reflex to Scope; Future; Expected date: 12/25/2023    4  Obesity (BMI 30-39  9)  Assessment & Plan:  Patient gained 7 lb diet exercise weight loss recommended    Orders:  -     Ambulatory referral to Nutrition Services; Future  -     CBC; Future; Expected date: 12/25/2023  -     Comprehensive metabolic panel; Future; Expected date: 12/25/2023  -     Lipid Panel with Direct LDL reflex; Future; Expected date: 12/25/2023  -     TSH, 3rd generation with Free T4 reflex; Future; Expected date: 12/25/2023  -     UA (URINE) with reflex to Scope; Future; Expected date: 12/25/2023    5  Mixed hyperlipidemia  Assessment & Plan:  Complete blood work that was ordered    Orders:  -     CBC; Future; Expected date: 12/25/2023  -     Comprehensive metabolic panel; Future; Expected date: 12/25/2023  -     Lipid Panel with Direct LDL reflex; Future; Expected date: 12/25/2023  -     TSH, 3rd generation with Free T4 reflex; Future; Expected date: 12/25/2023  -     UA (URINE) with reflex to Scope; Future; Expected date: 12/25/2023    6  Health care maintenance  Assessment & Plan:  Influenza vaccine refused    Orders:  -     CBC; Future; Expected date: 12/25/2023  -     Comprehensive metabolic panel; Future; Expected date: 12/25/2023  -     Lipid Panel with Direct LDL reflex; Future; Expected date: 12/25/2023  -     TSH, 3rd generation with Free T4 reflex; Future; Expected date: 12/25/2023  -     UA (URINE) with reflex to Scope; Future; Expected date: 12/25/2023    7  VENU (generalized anxiety disorder)  Assessment & Plan:  Stable follows with Psychiatry    Orders:  -     CBC; Future; Expected date: 12/25/2023  -     Comprehensive metabolic panel; Future; Expected date: 12/25/2023  -     Lipid Panel with Direct LDL reflex; Future; Expected date: 12/25/2023  -     TSH, 3rd generation with Free T4 reflex; Future; Expected date: 12/25/2023  -     UA (URINE) with reflex to Scope; Future; Expected date: 12/25/2023    8   Bipolar depression [facility])  Assessment & Plan:  As above    Orders:  -     CBC; Future; Expected date: 12/25/2023  -     Comprehensive metabolic panel; Future; Expected date: 12/25/2023  -     Lipid Panel with Direct LDL reflex; Future; Expected date: 12/25/2023  -     TSH, 3rd generation with Free T4 reflex; Future; Expected date: 12/25/2023  -     UA (URINE) with reflex to Scope; Future; Expected date: 12/25/2023    9  Acute post-traumatic headache, not intractable  -     CBC; Future; Expected date: 12/25/2023  -     Comprehensive metabolic panel; Future; Expected date: 12/25/2023  -     Lipid Panel with Direct LDL reflex; Future; Expected date: 12/25/2023  -     TSH, 3rd generation with Free T4 reflex; Future; Expected date: 12/25/2023  -     UA (URINE) with reflex to Scope; Future; Expected date: 12/25/2023    10  Concussion without loss of consciousness, initial encounter  -     CBC; Future; Expected date: 12/25/2023  -     Comprehensive metabolic panel; Future; Expected date: 12/25/2023  -     Lipid Panel with Direct LDL reflex; Future; Expected date: 12/25/2023  -     TSH, 3rd generation with Free T4 reflex; Future; Expected date: 12/25/2023  -     UA (URINE) with reflex to Scope; Future; Expected date: 12/25/2023    11  Vision abnormalities  -     CBC; Future; Expected date: 12/25/2023  -     Comprehensive metabolic panel; Future; Expected date: 12/25/2023  -     Lipid Panel with Direct LDL reflex; Future; Expected date: 12/25/2023  -     TSH, 3rd generation with Free T4 reflex; Future; Expected date: 12/25/2023  -     UA (URINE) with reflex to Scope; Future; Expected date: 12/25/2023    12  Balance problem  -     CBC; Future; Expected date: 12/25/2023  -     Comprehensive metabolic panel; Future; Expected date: 12/25/2023  -     Lipid Panel with Direct LDL reflex; Future; Expected date: 12/25/2023  -     TSH, 3rd generation with Free T4 reflex;  Future; Expected date: 12/25/2023  -     UA (URINE) with reflex to Scope; Future; Expected date: 12/25/2023    13  Traumatic injury of head, subsequent encounter  -     CBC; Future; Expected date: 12/25/2023  -     Comprehensive metabolic panel; Future; Expected date: 12/25/2023  -     Lipid Panel with Direct LDL reflex; Future; Expected date: 12/25/2023  -     TSH, 3rd generation with Free T4 reflex; Future; Expected date: 12/25/2023  -     UA (URINE) with reflex to Scope; Future; Expected date: 12/25/2023           Subjective      Patient doing well following with Neurology  Headaches have been stable  Patient has seen gastroenterology for his colitis  Patient gained 7 lb since last office visit  Unfortunate patient did not complete blood work    Review of Systems   Constitutional: Negative  HENT: Negative  Eyes: Negative  Respiratory: Negative  Cardiovascular: Negative  Gastrointestinal:        HPI   Endocrine: Negative  Genitourinary: Negative  Musculoskeletal: Negative  Skin: Negative  Allergic/Immunologic: Negative  Neurological:        HPI   Hematological: Negative  Psychiatric/Behavioral: Negative  Current Outpatient Medications on File Prior to Visit   Medication Sig   • diclofenac potassium (CATAFLAM) 50 mg tablet Take 1 tablet every 8 hours as needed for headache   • hydrOXYzine HCL (ATARAX) 25 mg tablet Take 25-50 mg by mouth every 6 (six) hours as needed   • lamoTRIgine (LaMICtal) 100 mg tablet TAKE 25MG TAB DAILY IN ADDITION  MG TAB (TOTAL DAILY DOSE 125MG) FOR 2 WEEKS  THEN INCREASE TO 150MG DAILY FOR TOTAL DOSE 150MG FOR 2 WEEKS, THEN INCREASE TO 25MG DAILY IN ADDITON 150MG FOR TOTAL DAILY DOSE 175MG   • loperamide (IMODIUM) 2 mg capsule Take 1 capsule (2 mg total) by mouth 4 (four) times a day as needed for diarrhea   • Magnesium Gluconate (MAGNESIUM 27 PO) Take by mouth as needed   • rizatriptan (MAXALT) 10 MG tablet Take 1 tablet (10 mg total) by mouth once as needed for migraine May repeat in 2 hours if needed   Max 2/24 hours, 9/month  • [DISCONTINUED] lamoTRIgine (LaMICtal) 25 mg tablet Take 25 mg by mouth in the morning  Objective     /86 (BP Location: Right arm, Patient Position: Sitting, Cuff Size: Adult)   Pulse 82   Temp 97 7 °F (36 5 °C) (Temporal)   Ht 6' 1" (1 854 m)   Wt 108 kg (237 lb)   BMI 31 27 kg/m²     Physical Exam  Vitals and nursing note reviewed  Constitutional:       Appearance: Normal appearance  HENT:      Head: Normocephalic and atraumatic  Eyes:      General: No scleral icterus  Neck:      Vascular: No carotid bruit  Cardiovascular:      Rate and Rhythm: Normal rate and regular rhythm  Heart sounds: Normal heart sounds  Pulmonary:      Effort: Pulmonary effort is normal       Breath sounds: Normal breath sounds  Abdominal:      General: Bowel sounds are normal       Palpations: Abdomen is soft  Tenderness: There is no abdominal tenderness  Musculoskeletal:      Cervical back: Neck supple  Right lower leg: No edema  Left lower leg: No edema  Skin:     General: Skin is warm and dry  Neurological:      General: No focal deficit present  Mental Status: He is alert  Psychiatric:         Mood and Affect: Mood normal        Dilip Bingham DO  BMI Counseling: Body mass index is 31 27 kg/m²  The BMI is above normal  Patient referred to nutritionist due to patient being obese  BMI Counseling: Body mass index is 31 27 kg/m²  The BMI is above normal  Nutrition recommendations include reducing intake of cholesterol

## 2022-12-28 NOTE — PATIENT INSTRUCTIONS
Diet exercise weight loss recommended, I have referred you to nutritionist a should be calling you next few weeks if they do not please contact my office  Complete blood work that was ordered  Follow all specialist per their instructions  Return in 1 year for office visit blood work sooner if need

## 2023-01-31 ENCOUNTER — CLINICAL SUPPORT (OUTPATIENT)
Dept: NUTRITION | Facility: HOSPITAL | Age: 33
End: 2023-01-31

## 2023-01-31 VITALS — BODY MASS INDEX: 30.69 KG/M2 | WEIGHT: 232.59 LBS

## 2023-01-31 DIAGNOSIS — E66.9 OBESITY (BMI 30-39.9): ICD-10-CM

## 2023-01-31 NOTE — PROGRESS NOTES
Nutrition Assessment Form    Patient Name: Kiersten Mancera    YOB: 1990    Sex: Male     Assessment Date: 1/31/2023  Start Time: 9 AM Stop Time: 10 AM Total Minutes: 60 minutes     Data:  Present at session: self   Parent/Patient Concerns/reason for visit: "I have high cholesterol"   Medical Dx/Reason for Referral: E66 9 (ICD-10-CM) - Obesity (BMI 30-39  9)   Past Medical History:   Diagnosis Date   • COVID-19 5/18/2021    Family history high cholesterol and high blood pressure  Father had brain aneurism     Current Outpatient Medications   Medication Sig Dispense Refill   • diclofenac potassium (CATAFLAM) 50 mg tablet Take 1 tablet every 8 hours as needed for headache 30 tablet 0   • hydrOXYzine HCL (ATARAX) 25 mg tablet Take 25-50 mg by mouth every 6 (six) hours as needed     • lamoTRIgine (LaMICtal) 100 mg tablet TAKE 25MG TAB DAILY IN ADDITION  MG TAB (TOTAL DAILY DOSE 125MG) FOR 2 WEEKS  THEN INCREASE TO 150MG DAILY FOR TOTAL DOSE 150MG FOR 2 WEEKS, THEN INCREASE TO 25MG DAILY IN ADDITON 150MG FOR TOTAL DAILY DOSE 175MG     • loperamide (IMODIUM) 2 mg capsule Take 1 capsule (2 mg total) by mouth 4 (four) times a day as needed for diarrhea 30 capsule 0   • Magnesium Gluconate (MAGNESIUM 27 PO) Take by mouth as needed     • rizatriptan (MAXALT) 10 MG tablet Take 1 tablet (10 mg total) by mouth once as needed for migraine May repeat in 2 hours if needed  Max 2/24 hours, 9/month  9 tablet 6     No current facility-administered medications for this visit          Additional Meds/Supplements: MVI with iron - taking for 4-5 months   Special Learning Needs/barriers to learning/any new barriers NA   Height: HC Readings from Last 5 Encounters:   No data found for [facility]       Weight: Wt Readings from Last 10 Encounters:   12/28/22 108 kg (237 lb)   09/26/22 104 kg (230 lb)   09/22/22 104 kg (228 lb 6 4 oz)   06/06/22 105 kg (231 lb)   06/02/22 105 kg (231 lb 8 oz)   05/11/22 108 kg (238 lb)   05/08/22 107 kg (235 lb)   05/03/21 100 kg (221 lb)   11/27/20 108 kg (237 lb)   02/24/20 108 kg (237 lb)     Estimated body mass index is 31 27 kg/m² as calculated from the following:    Height as of 12/28/22: 6' 1" (1 854 m)  Weight as of 12/28/22: 108 kg (237 lb)  Recent Weight Change: []Yes     [x]No  Amount: 235# usual, fluctuates minimally      Energy Needs: 20 kcal/kg 2100 kcals   Allergies   Allergen Reactions   • Penicillins Hives    or intolerances NKFA, sensitivity to dairy - mucus build up   Social History     Substance and Sexual Activity   Alcohol Use Not Currently    Comment: per pt he is not drinking    No longer drinks, history of alcoholism   Social History     Tobacco Use   Smoking Status Never   Smokeless Tobacco Current   • Types: Chew    Chewing tobacco, daily   Who shops? spouse   Who cooks/cooking methods   patient and spouse  Cooking methods: bake/gomez/air gomez/grill/boil/other________  Take out: 7x/wk - a least once a day for pt   Exercise: No additional activity/working out right now  Used to go to the gym - every day before work (5 days), boot camp style, different every day; Pt states "I felt better," core workouts, designed for stamina; stopped over 1 year ago   Prior Nutritional Counseling?  []Yes     [x]No  When:      Why:         Diet Hx:  Breakfast: 9 AM: Every day is different - breakfast sandwich (croisant, lance, egg, and cheese), coffee from [employer] (iced late with caramel swirl, almond milk), overnight oats (white chocolate chips, dried fruit, agave, oatmeal, oat milk), oatmeal, fruit salad, bagel (butter, cream cheese), english muffin, eggs, pancakes   Lunch: 2:30: burger from diner next to job (josh, lance, mushrooms, miller) with onion rings, fried chicken, gas station sub sandwich (italian lettuce, tomato, oil, vinegar, no cheese), when he packs - PBJ, PB fluff, fruit, nutrigrain bar         Dinner: 5:00 PM: air fried chicken wings, spaghetti and meatballs, deer meat (pt hunts) in stew with vegetables, snap peas, green beans, all fresh vegetables, steak, sausage, "meat is few and far between," cabbage and noodles  Snacks: AM -   PM - rice crispy treat, fruit (normally no snacks)  HS -         Nutrition Diagnosis:   Food and nutrition related knowledge deficit  related to Lack of prior exposure to accurate nutrition related information as evidenced by No prior knowledge of need for food and nutrition related recommendations       Any change or new dx since previous visit:      Nutrition Diagnosis:         Medical Nutrition Therapy Intervention:  [x]Individualized Meal Plan  Provided LDL Cholesterol Nutrition Therapy handout, discussed in detail [x]Understanding Lab Values  Discussed LDL cholesterol and Deysi lipid panel  []Basic Pathophysiology of Disease []Food/Medication Interactions   []Food Diary [x]Exercise  150 mins of moderate activity weekly, discussed importance of variety of activity, including wt bearing activities 2-3x/wk x 10-15mins  Discussed importance of activity throughout the lifecycle and its impact on overall health/stress management, etc    [x]Lifestyle/Behavior Modification Techniques  Discussed the importance of adequate sleep, and ideal sleeping conditions, including a cool temperature 64-68 degrees F, and a dark and quiet room  Also discussed the importance of a regular and consistent sleep routine, including minimizing blue light exposure an hour before sleeping  Weekend habits should include staying fairly consistent with weekday sleep habits to minimize disruption during the week  A connection was made between getting adequate and good quality sleep and the ability to handle stress the next day, make healthy food choices, and be active  []Medication, Mechanism of Action   [x]Label Reading: CHO/ Na/ Fat/ other_________  Discussed importance of label reading in relation to dietary cholesterol   []Self Blood Glucose Monitoring   []Weight/BMI Goals: gain/lose/maintain []Other -    Other Notes/Assessment: Pt works for a [employer]  An active job some days, other days it is sedentary  Starting work at 3-4 in the morning  Son is allergic to dairy  Lives with wife, 3 kind (2 daughters, 1 son)  They play sports, active family  Cat and fish at home  Normally do not eat a lot of meat  Wife cooks different types of rice and vegetables  "I eat when I am hungry " Pt normally sleeps 3 hours a night  In bed at 9PM  Work schedule has pt starting at [employer]  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, 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  Pre-change in schedule pt would wake up at [facility]  Work is the main stressor  Does not consume soda  Pt wants to decrease dairy intake  30 oz yeti cup 4-5 times daily  Comprehension: [x]Excellent  []Very Good  []Good  []Fair   []Poor    Receptivity: []Excellent  [x]Very Good  []Good  []Fair   []Poor    Expected Compliance: []Excellent  []Very Good  [x]Good  []Fair   []Poor        Goals:  1  Pt will have breakfast within 1 hours of waking up 3 times weekly  2  Pt will concentrate calories/energy intake to the beginning of the day  3  Pt will pack lunch at least 3 times weekly  No follow-ups on file    Labs:  CMP  Lab Results   Component Value Date    K 4 2 05/11/2022     05/11/2022    CO2 29 05/11/2022    BUN 10 05/11/2022    CREATININE 0 98 05/11/2022    GLUF 91 11/27/2020    CALCIUM 8 9 05/11/2022    AST 22 05/11/2022    ALT 48 05/11/2022    ALKPHOS 56 05/11/2022    EGFR 101 05/11/2022       BMP  Lab Results   Component Value Date    CALCIUM 8 9 05/11/2022    K 4 2 05/11/2022    CO2 29 05/11/2022     05/11/2022    BUN 10 05/11/2022    CREATININE 0 98 05/11/2022       Lipids  Lab Results   Component Value Date    CHOL 220 (H) 08/28/2015     Lab Results   Component Value Date    HDL 31 (L) 05/11/2022    HDL 47 11/27/2020    HDL 36 (L) 05/30/2018     Lab Results   Component Value Date    LDLCALC 144 (H) 05/11/2022    LDLCALC 142 (H) 11/27/2020 LDLCALC 115 (H) 05/30/2018     Lab Results   Component Value Date    TRIG 260 (H) 05/11/2022    TRIG 128 11/27/2020    TRIG 166 (H) 05/30/2018     No results found for: CHOLHDL    Hemoglobin A1C  No results found for: HGBA1C    Fasting Glucose  Lab Results   Component Value Date    GLUF 91 11/27/2020       Insulin     Thyroid  No results found for: TSH, R5AXIER, [id number], THYROIDAB    Hepatic Function Panel  Lab Results   Component Value Date    ALT 48 05/11/2022    AST 22 05/11/2022    ALKPHOS 56 05/11/2022       Celiac Disease Antibody Panel  No results found for: ENDOMYSIAL IGA, GLIADIN IGA, GLIADIN IGG, IGA, TISSUE TRANSGLUT AB, TTG IGA   Iron  No results found for: IRON, TIBC, [address] [facility]  [address] [facility] [address] [place] [address]

## 2024-01-03 ENCOUNTER — TELEPHONE (OUTPATIENT)
Dept: FAMILY MEDICINE CLINIC | Facility: CLINIC | Age: 34
End: 2024-01-03

## 2024-01-03 NOTE — TELEPHONE ENCOUNTER
Patient did not show up for his scheduled appointment today, 1/3/2024.  Please call patient have him complete blood work that was ordered and reschedule in the next few weeks

## 2024-02-21 PROBLEM — Z00.00 HEALTH CARE MAINTENANCE: Status: RESOLVED | Noted: 2020-02-24 | Resolved: 2024-02-21

## 2024-12-03 ENCOUNTER — OFFICE VISIT (OUTPATIENT)
Dept: FAMILY MEDICINE CLINIC | Facility: CLINIC | Age: 34
End: 2024-12-03
Payer: COMMERCIAL

## 2024-12-03 ENCOUNTER — APPOINTMENT (OUTPATIENT)
Dept: RADIOLOGY | Facility: MEDICAL CENTER | Age: 34
End: 2024-12-03
Payer: COMMERCIAL

## 2024-12-03 VITALS
BODY MASS INDEX: 32.18 KG/M2 | HEIGHT: 73 IN | HEART RATE: 66 BPM | DIASTOLIC BLOOD PRESSURE: 80 MMHG | WEIGHT: 242.8 LBS | SYSTOLIC BLOOD PRESSURE: 120 MMHG | OXYGEN SATURATION: 97 % | TEMPERATURE: 97.7 F

## 2024-12-03 DIAGNOSIS — F41.1 GAD (GENERALIZED ANXIETY DISORDER): ICD-10-CM

## 2024-12-03 DIAGNOSIS — F31.9 BIPOLAR DEPRESSION (HCC): ICD-10-CM

## 2024-12-03 DIAGNOSIS — R13.13 PHARYNGEAL DYSPHAGIA: ICD-10-CM

## 2024-12-03 DIAGNOSIS — J30.9 ALLERGIC RHINITIS, UNSPECIFIED SEASONALITY, UNSPECIFIED TRIGGER: ICD-10-CM

## 2024-12-03 DIAGNOSIS — R07.0 THROAT PAIN IN ADULT: ICD-10-CM

## 2024-12-03 DIAGNOSIS — J02.9 SORE THROAT: Primary | ICD-10-CM

## 2024-12-03 DIAGNOSIS — J02.9 SORE THROAT: ICD-10-CM

## 2024-12-03 LAB
S PYO AG THROAT QL: NEGATIVE
SARS-COV-2 AG UPPER RESP QL IA: NEGATIVE
VALID CONTROL: NORMAL

## 2024-12-03 PROCEDURE — 87811 SARS-COV-2 COVID19 W/OPTIC: CPT | Performed by: FAMILY MEDICINE

## 2024-12-03 PROCEDURE — 87880 STREP A ASSAY W/OPTIC: CPT | Performed by: FAMILY MEDICINE

## 2024-12-03 PROCEDURE — 99214 OFFICE O/P EST MOD 30 MIN: CPT | Performed by: FAMILY MEDICINE

## 2024-12-03 PROCEDURE — 70360 X-RAY EXAM OF NECK: CPT

## 2024-12-03 RX ORDER — LORATADINE 10 MG/1
10 TABLET ORAL DAILY
COMMUNITY

## 2024-12-03 NOTE — PROGRESS NOTES
"Name: Cristi Junior      : 1990      MRN: [id number]  Encounter Provider: Dilip Bingham DO  Encounter Date: 12/3/2024   Encounter department: [facility] PRIMARY CARE  1.  Sore throat, no clinical indication of infection  Rapid COVID-negative  Rapid strep negative  2.  Throat pain  Soft tissue neck x-ray ordered  3.  Dysphagia  As above barium swallow ordered  Refer to ENT  4.  Allergic rhinitis patient may use Claritin 10 mg daily if needed  5.  Bipolar depression/VENU, in remission follows with psychiatry  6  If symptoms worsen before patient sees gastroenterologist, report to ER  :  Assessment & Plan  Sore throat  Rapid strep negative, no clinical indication  Rapid COVID-negative    Orders:    XR neck soft tissue; Future    FL barium swallow ROUTINE esophagus; Future    POCT Rapid Covid Ag    POCT rapid ANTIGEN strepA    Ambulatory Referral to Otolaryngology; Future    Throat pain in adult  Check soft tissue neck x-ray  Check barium swallow  Orders:    XR neck soft tissue; Future    FL barium swallow ROUTINE esophagus; Future    Ambulatory Referral to Otolaryngology; Future    Pharyngeal dysphagia  Check barium swallow  Refer to ENT  Orders:    XR neck soft tissue; Future    FL barium swallow ROUTINE esophagus; Future    Ambulatory Referral to Otolaryngology; Future    Allergic rhinitis, unspecified seasonality, unspecified trigger  Patient may use Claritin if needed         Bipolar depression (HCC)  In remission follows with psychiatry         VENU (generalized anxiety disorder)  In remission sees psychiatry                History of Present Illness     Weeks ago patient had a URI/viral cold seem to go away however 2 days ago patient developed a acute sore throat and feels like food is \"sticking\" when he swallows.  No fever chills no other symptomatology for URI according to patient patient feels like it is sticking in the upper neck, back of throat.      Review of Systems " "  Constitutional:  Negative for chills and fever.   HENT:          HPI   Eyes: Negative.    Respiratory:  Negative for cough and choking.    Cardiovascular:  Negative for chest pain.   Gastrointestinal:         HPI   Endocrine: Negative.    Genitourinary: Negative.    Musculoskeletal: Negative.    Skin: Negative.    Allergic/Immunologic: Positive for environmental allergies.   Neurological: Negative.    Hematological: Negative.    Psychiatric/Behavioral: Negative.            Objective   /80 (BP Location: Right arm, Patient Position: Sitting, Cuff Size: Standard)   Pulse 66   Temp 97.7 °F (36.5 °C) (Tympanic)   Ht 6' 1\" (1.854 m)   Wt 110 kg (242 lb 12.8 oz)   SpO2 97%   BMI 32.03 kg/m²      Physical Exam  Vitals and nursing note reviewed.   Constitutional:       General: He is not in acute distress.     Appearance: Normal appearance. He is not ill-appearing, toxic-appearing or diaphoretic.   HENT:      Head: Normocephalic and atraumatic.      Right Ear: Tympanic membrane normal.      Left Ear: Tympanic membrane normal.      Nose:      Comments: Very mild allergic turbinates     Mouth/Throat:      Pharynx: No oropharyngeal exudate or posterior oropharyngeal erythema.      Comments: Scant clear postnasal drip negative pharyngeal injection or exudate  Eyes:      Conjunctiva/sclera: Conjunctivae normal.   Neck:      Vascular: No carotid bruit.   Cardiovascular:      Rate and Rhythm: Normal rate and regular rhythm.      Heart sounds: Normal heart sounds.   Pulmonary:      Effort: Pulmonary effort is normal.      Breath sounds: Normal breath sounds.   Abdominal:      General: Bowel sounds are normal.      Palpations: Abdomen is soft.      Tenderness: There is no abdominal tenderness.   Musculoskeletal:      Cervical back: Neck supple. No rigidity or tenderness.      Right lower leg: No edema.      Left lower leg: No edema.   Lymphadenopathy:      Cervical: No cervical adenopathy.   Skin:     General: Skin is " warm and dry.   Neurological:      General: No focal deficit present.      Mental Status: He is alert.   Psychiatric:         Mood and Affect: Mood normal.

## 2024-12-05 ENCOUNTER — RESULTS FOLLOW-UP (OUTPATIENT)
Dept: FAMILY MEDICINE CLINIC | Facility: CLINIC | Age: 34
End: 2024-12-05

## 2024-12-05 DIAGNOSIS — J05.10 EPIGLOTTITIS: Primary | ICD-10-CM

## 2024-12-05 RX ORDER — AZITHROMYCIN 250 MG/1
TABLET, FILM COATED ORAL
Qty: 6 TABLET | Refills: 0 | Status: SHIPPED | OUTPATIENT
Start: 2024-12-05 | End: 2024-12-06 | Stop reason: CLARIF

## 2024-12-05 RX ORDER — METHYLPREDNISOLONE 4 MG/1
TABLET ORAL
Qty: 1 EACH | Refills: 0 | Status: SHIPPED | OUTPATIENT
Start: 2024-12-05 | End: 2024-12-06 | Stop reason: CLARIF

## 2024-12-06 ENCOUNTER — NURSE TRIAGE (OUTPATIENT)
Age: 34
End: 2024-12-06

## 2024-12-06 DIAGNOSIS — J05.10 EPIGLOTTITIS: ICD-10-CM

## 2024-12-06 RX ORDER — AZITHROMYCIN 250 MG/1
TABLET, FILM COATED ORAL
Qty: 6 TABLET | Refills: 0 | Status: SHIPPED | OUTPATIENT
Start: 2024-12-06 | End: 2024-12-11

## 2024-12-06 RX ORDER — METHYLPREDNISOLONE 4 MG/1
TABLET ORAL
Qty: 1 EACH | Refills: 0 | Status: SHIPPED | OUTPATIENT
Start: 2024-12-06 | End: 2024-12-12

## 2024-12-06 NOTE — TELEPHONE ENCOUNTER
"Reason for Disposition  • Prescription prescribed recently is not at pharmacy and triager has access to patient's EMR and prescription is recorded in the EMR    Answer Assessment - Initial Assessment Questions  1. NAME of MEDICINE: \"What medicine(s) are you calling about?\"      azithromycin (ZITHROMAX) 250 mg tablet  methylPREDNISolone 4 MG tablet therapy pack  2. QUESTION: \"What is your question?\" (e.g., double dose of medicine, side effect)      Medications sent to wrong pharmacy.  Medications need to go to the [facility] in [place].  3. PRESCRIBER: \"Who prescribed the medicine?\" Reason: if prescribed by specialist, call should be referred to that group.      Dr. Bingham    Protocols used: Medication Question Call-Adult-[place]    "

## 2024-12-06 NOTE — TELEPHONE ENCOUNTER
Regarding: sent to wrong pharmacy  ----- Message from Yaneli COBIAN sent at 12/6/2024  4:09 PM EST -----  Please resend ZITHROMAX and methylPREDNISolone 4 MG tablet therapy pack to the [facility] pharmacy Count [facility] the [facility]

## 2024-12-07 ENCOUNTER — APPOINTMENT (EMERGENCY)
Dept: CT IMAGING | Facility: HOSPITAL | Age: 34
End: 2024-12-07
Payer: COMMERCIAL

## 2024-12-07 ENCOUNTER — HOSPITAL ENCOUNTER (EMERGENCY)
Facility: HOSPITAL | Age: 34
Discharge: HOME | End: 2024-12-07
Attending: EMERGENCY MEDICINE
Payer: COMMERCIAL

## 2024-12-07 VITALS
OXYGEN SATURATION: 94 % | HEART RATE: 81 BPM | RESPIRATION RATE: 18 BRPM | WEIGHT: 240 LBS | DIASTOLIC BLOOD PRESSURE: 84 MMHG | BODY MASS INDEX: 31.66 KG/M2 | SYSTOLIC BLOOD PRESSURE: 143 MMHG | TEMPERATURE: 96.8 F

## 2024-12-07 DIAGNOSIS — J02.9 SORE THROAT: Primary | ICD-10-CM

## 2024-12-07 LAB
ANION GAP SERPL CALCULATED.3IONS-SCNC: 7 MMOL/L (ref 4–13)
BASOPHILS # BLD AUTO: 0.03 THOUSANDS/ÂΜL (ref 0–0.1)
BASOPHILS NFR BLD AUTO: 0 % (ref 0–1)
BUN SERPL-MCNC: 15 MG/DL (ref 5–25)
CALCIUM SERPL-MCNC: 9.8 MG/DL (ref 8.4–10.2)
CHLORIDE SERPL-SCNC: 103 MMOL/L (ref 96–108)
CO2 SERPL-SCNC: 26 MMOL/L (ref 21–32)
CREAT SERPL-MCNC: 0.86 MG/DL (ref 0.6–1.3)
EOSINOPHIL # BLD AUTO: 0 THOUSAND/ÂΜL (ref 0–0.61)
EOSINOPHIL NFR BLD AUTO: 0 % (ref 0–6)
ERYTHROCYTE [DISTWIDTH] IN BLOOD BY AUTOMATED COUNT: 11.9 % (ref 11.6–15.1)
FLUAV RNA RESP QL NAA+PROBE: NEGATIVE
FLUBV RNA RESP QL NAA+PROBE: NEGATIVE
GFR SERPL CREATININE-BSD FRML MDRD: 112 ML/MIN/1.73SQ M
GLUCOSE SERPL-MCNC: 149 MG/DL (ref 65–140)
HCT VFR BLD AUTO: 48.3 % (ref 36.5–49.3)
HGB BLD-MCNC: 15.8 G/DL (ref 12–17)
IMM GRANULOCYTES # BLD AUTO: 0.05 THOUSAND/UL (ref 0–0.2)
IMM GRANULOCYTES NFR BLD AUTO: 0 % (ref 0–2)
LYMPHOCYTES # BLD AUTO: 1.48 THOUSANDS/ÂΜL (ref 0.6–4.47)
LYMPHOCYTES NFR BLD AUTO: 12 % (ref 14–44)
MCH RBC QN AUTO: 29.8 PG (ref 26.8–34.3)
MCHC RBC AUTO-ENTMCNC: 32.7 G/DL (ref 31.4–37.4)
MCV RBC AUTO: 91 FL (ref 82–98)
MONOCYTES # BLD AUTO: 0.58 THOUSAND/ÂΜL (ref 0.17–1.22)
MONOCYTES NFR BLD AUTO: 5 % (ref 4–12)
NEUTROPHILS # BLD AUTO: 10.57 THOUSANDS/ÂΜL (ref 1.85–7.62)
NEUTS SEG NFR BLD AUTO: 83 % (ref 43–75)
NRBC BLD AUTO-RTO: 0 /100 WBCS
PLATELET # BLD AUTO: 282 THOUSANDS/UL (ref 149–390)
PMV BLD AUTO: 11.2 FL (ref 8.9–12.7)
POTASSIUM SERPL-SCNC: 4.2 MMOL/L (ref 3.5–5.3)
RBC # BLD AUTO: 5.3 MILLION/UL (ref 3.88–5.62)
RSV RNA RESP QL NAA+PROBE: NEGATIVE
S PYO DNA THROAT QL NAA+PROBE: NOT DETECTED
SARS-COV-2 RNA RESP QL NAA+PROBE: NEGATIVE
SODIUM SERPL-SCNC: 136 MMOL/L (ref 135–147)
WBC # BLD AUTO: 12.71 THOUSAND/UL (ref 4.31–10.16)

## 2024-12-07 PROCEDURE — 99284 EMERGENCY DEPT VISIT MOD MDM: CPT

## 2024-12-07 PROCEDURE — 99284 EMERGENCY DEPT VISIT MOD MDM: CPT | Performed by: EMERGENCY MEDICINE

## 2024-12-07 PROCEDURE — 87651 STREP A DNA AMP PROBE: CPT | Performed by: EMERGENCY MEDICINE

## 2024-12-07 PROCEDURE — 0241U HB NFCT DS VIR RESP RNA 4 TRGT: CPT | Performed by: EMERGENCY MEDICINE

## 2024-12-07 PROCEDURE — 36415 COLL VENOUS BLD VENIPUNCTURE: CPT | Performed by: EMERGENCY MEDICINE

## 2024-12-07 PROCEDURE — 80048 BASIC METABOLIC PNL TOTAL CA: CPT | Performed by: EMERGENCY MEDICINE

## 2024-12-07 PROCEDURE — 85025 COMPLETE CBC W/AUTO DIFF WBC: CPT | Performed by: EMERGENCY MEDICINE

## 2024-12-07 PROCEDURE — 70491 CT SOFT TISSUE NECK W/DYE: CPT

## 2024-12-07 RX ORDER — LIDOCAINE HYDROCHLORIDE 20 MG/ML
15 SOLUTION OROPHARYNGEAL 4 TIMES DAILY PRN
Qty: 100 ML | Refills: 0 | Status: SHIPPED | OUTPATIENT
Start: 2024-12-07

## 2024-12-07 RX ORDER — LIDOCAINE HYDROCHLORIDE 20 MG/ML
15 SOLUTION OROPHARYNGEAL ONCE
Status: COMPLETED | OUTPATIENT
Start: 2024-12-07 | End: 2024-12-07

## 2024-12-07 RX ADMIN — IOHEXOL 85 ML: 350 INJECTION, SOLUTION INTRAVENOUS at 20:52

## 2024-12-07 RX ADMIN — LIDOCAINE HYDROCHLORIDE 15 ML: 20 SOLUTION ORAL at 23:17

## 2024-12-08 NOTE — ED PROVIDER NOTES
Time reflects when diagnosis was documented in both MDM as applicable and the Disposition within this note       Time User Action Codes Description Comment    12/7/2024 11:02 PM Memo Choudhury Add [J02.9] Sore throat           ED Disposition       ED Disposition   Discharge    Condition   Stable    Date/Time   Sat Dec 7, 2024 11:01 PM    Comment   Cristi Junior discharge to home/self care.                   Assessment & Plan       Medical Decision Making  4-year-old male with sore throat.  Responded well to viscous lidocaine.  No abscess or epiglottitis appreciated on CT.  Physical exam reassuring.  Tolerating secretions well.  No significant swelling.  Discussed warning signs and symptoms with the patient as well as when to return to the emergency department versus follow up with [facility]. Patient states understanding and agreement with the plan.  This note was completed using dictation software.       Problems Addressed:  Sore throat: acute illness or injury    Amount and/or Complexity of Data Reviewed  External Data Reviewed: notes.  Labs: ordered.  Radiology: ordered.    Risk  OTC drugs.  Prescription drug management.             Medications   iohexol (OMNIPAQUE) 350 MG/ML injection (SINGLE-DOSE) 85 mL (85 mL Intravenous Given 12/7/24 2052)   Lidocaine Viscous HCl (XYLOCAINE) 2 % mucosal solution 15 mL (15 mL Swish & Swallow Given 12/7/24 2317)       ED Risk Strat Scores                           SBIRT 20yo+      Flowsheet Row Most Recent Value   Initial Alcohol Screen: US AUDIT-C     1. How often do you have a drink containing alcohol? 0 Filed at: 12/07/2024 1954   2. How many drinks containing alcohol do you have on a typical day you are drinking?  0 Filed at: 12/07/2024 1954   3a. Male UNDER 65: How often do you have five or more drinks on one occasion? 0 Filed at: 12/07/2024 1954   Audit-C Score 0 Filed at: 12/07/2024 1954   FREDDY: How many times in the past year have you...    Used an illegal drug or used  a prescription medication for non-medical reasons? Never Filed at: 12/07/2024 1954                            History of Present Illness       Chief Complaint   Patient presents with    Difficulty Swallowing     Patient recently diagnosed with epiglottis. Patient states he is having difficulty swallowing. Patient states he feels that food is getting stuck in throat and it is painful to swallow.        Past Medical History:   Diagnosis Date    COVID-19 5/18/2021      Past Surgical History:   Procedure Laterality Date    SHOULDER SURGERY      VASECTOMY  2018      Family History   Problem Relation Age of Onset    Bipolar disorder Mother     Addiction problem Father     Hyperlipidemia Father     Hypertension Father       Social History     Tobacco Use    Smoking status: Never    Smokeless tobacco: Current     Types: Chew   Vaping Use    Vaping status: Never Used   Substance Use Topics    Alcohol use: Not Currently     Comment: per pt he is not drinking    Drug use: No      E-Cigarette/Vaping    E-Cigarette Use Never User       E-Cigarette/Vaping Substances    Nicotine No     THC No     CBD No     Flavoring No     Other No     Unknown No       I have reviewed and agree with the history as documented.     He came in because he had an episode of difficulty swallowing a piece of dinner.  It ultimately went down without aspiration.  Continuing to have no difficulty swallowing his secretions.  Reports the sore throat has persisted despite antibiotics and steroids administered by urgent care.  Some chills recently.  Has been compliant with the medications.  However, he has only had approximately 24 hours worth of antibiotic coverage.        Review of Systems   Constitutional:  Negative for chills and fever.   Eyes:  Negative for visual disturbance.   Respiratory:  Negative for shortness of breath.    Cardiovascular:  Negative for chest pain.   Gastrointestinal:  Negative for abdominal distention and abdominal pain.    Endocrine: Negative for polyuria.   Genitourinary:  Negative for decreased urine volume and dysuria.   Neurological:  Negative for dizziness, syncope and weakness.           Objective       ED Triage Vitals [12/07/24 1951]   Temperature Pulse Blood Pressure Respirations SpO2 Patient Position - Orthostatic VS   (!) 96.8 °F (36 °C) 81 143/84 18 94 % --      Temp Source Heart Rate Source BP Location FiO2 (%) Pain Score    Tympanic -- -- -- 6      Vitals      Date and Time Temp Pulse SpO2 Resp BP Pain Score FACES Pain Rating User   12/07/24 1951 96.8 °F (36 °C) 81 94 % 18 143/84 6 -- JS            Physical Exam  Constitutional:       General: He is not in acute distress.     Appearance: He is well-developed. He is not ill-appearing, toxic-appearing or diaphoretic.   HENT:      Head: Normocephalic and atraumatic.      Right Ear: External ear normal.      Left Ear: External ear normal.      Nose: Nose normal.      Mouth/Throat:      Mouth: Mucous membranes are moist.      Pharynx: Oropharynx is clear.   Eyes:      Conjunctiva/sclera: Conjunctivae normal.      Pupils: Pupils are equal, round, and reactive to light.   Cardiovascular:      Rate and Rhythm: Normal rate and regular rhythm.      Heart sounds: Normal heart sounds.   Pulmonary:      Effort: Pulmonary effort is normal. No respiratory distress.      Breath sounds: Normal breath sounds.   Abdominal:      General: Bowel sounds are normal.      Palpations: Abdomen is soft.   Musculoskeletal:         General: Normal range of motion.      Cervical back: Normal range of motion and neck supple.   Skin:     General: Skin is warm and dry.   Neurological:      Mental Status: He is alert and oriented to person, place, and time.   Psychiatric:         Behavior: Behavior normal.         Thought Content: Thought content normal.         Judgment: Judgment normal.         Results Reviewed       Procedure Component Value Units Date/Time    FLU/RSV/COVID - if FLU/RSV clinically  relevant (2hr TAT) [[id number]]  (Normal) Collected: 12/07/24 2004    Lab Status: Final result Specimen: Nares from Nose Updated: 12/07/24 2047     SARS-CoV-2 Negative     INFLUENZA A PCR Negative     INFLUENZA B PCR Negative     RSV PCR Negative    Narrative:      This test has been performed using the CoV-2/Flu/RSV plus assay on the [employer] Gene[employer]pert platform. This test has been validated by the  and verified by the performing laboratory.     This test is designed to amplify and detect the following: nucleocapsid (N), envelope (E), and RNA-dependent RNA polymerase (RdRP) genes of the SARS-CoV-2 genome; matrix (M), basic polymerase (PB2), and acidic protein (PA) segments of the influenza A genome; matrix (M) and non-structural protein (NS) segments of the influenza B genome, and the nucleocapsid genes of RSV A and RSV B.     Positive results are indicative of the presence of Flu A, Flu B, RSV, and/or SARS-CoV-2 RNA. Positive results for SARS-CoV-2 or suspected novel influenza should be reported to state, local, or federal health departments according to local reporting requirements.      All results should be assessed in conjunction with clinical presentation and other laboratory markers for clinical management.     FOR PEDIATRIC PATIENTS - copy/paste COVID Guidelines URL to browser: [email or web address]       Strep A PCR [[id number]]  (Normal) Collected: 12/07/24 2004    Lab Status: Final result Specimen: Throat Updated: 12/07/24 2034     STREP A PCR Not Detected    Basic metabolic panel [[id number]]  (Abnormal) Collected: 12/07/24 2004    Lab Status: Final result Specimen: Blood from Arm, Right Updated: 12/07/24 2026     Sodium 136 mmol/L      Potassium 4.2 mmol/L      Chloride 103 mmol/L      CO2 26 mmol/L      ANION GAP 7 mmol/L      BUN 15 mg/dL      Creatinine 0.86 mg/dL      Glucose 149 mg/dL      Calcium 9.8 mg/dL      eGFR 112 ml/min/1.73sq m      Narrative:      National Kid[employer] Foundation guidelines for Chronic Kidney Disease (CKD):     Stage 1 with normal or high GFR (GFR > 90 mL/min/1.73 square meters)    Stage 2 Mild CKD (GFR = 60-89 mL/min/1.73 square meters)    Stage 3A Moderate CKD (GFR = 45-59 mL/min/1.73 square meters)    Stage 3B Moderate CKD (GFR = 30-44 mL/min/1.73 square meters)    Stage 4 Severe CKD (GFR = 15-29 mL/min/1.73 square meters)    Stage 5 End Stage CKD (GFR <15 mL/min/1.73 square meters)  Note: GFR calculation is accurate only with a steady state creatinine    CBC and differential [[id number]]  (Abnormal) Collected: 12/07/24 2004    Lab Status: Final result Specimen: Blood from Arm, Right Updated: 12/07/24 2010     WBC 12.71 Thousand/uL      RBC 5.30 Million/uL      Hemoglobin 15.8 g/dL      Hematocrit 48.3 %      MCV 91 fL      MCH 29.8 pg      MCHC 32.7 g/dL      RDW 11.9 %      MPV 11.2 fL      Platelets 282 Thousands/uL      nRBC 0 /100 WBCs      Segmented % 83 %      Immature Grans % 0 %      Lymphocytes % 12 %      Monocytes % 5 %      Eosinophils Relative 0 %      Basophils Relative 0 %      Absolute Neutrophils 10.57 Thousands/µL      Absolute Immature Grans 0.05 Thousand/uL      Absolute Lymphocytes 1.48 Thousands/µL      Absolute Monocytes 0.58 Thousand/µL      Eosinophils Absolute 0.00 Thousand/µL      Basophils Absolute 0.03 Thousands/µL             CT soft tissue neck with contrast   Final Interpretation by Ross Burr DO (12/07 4065)      Shotty left cervical chain lymph nodes, largest measures approximately 1.1 cm in short axis dimensions, nonspecific, possibly reactive.      No acute process seen otherwise.      Other findings as above.      Workstation performed: [id number]             Procedures    ED Medication and Procedure Management   Prior to Admission Medications   Prescriptions Last Dose Informant Patient Reported? Taking?   ARIPiprazole (ABILIFY) 10 mg tablet  Self Yes Yes   Sig: Take  10 mg by mouth   Magnesium Gluconate (MAGNESIUM 27 PO)  Self Yes Yes   Sig: Take by mouth as needed   azithromycin (ZITHROMAX) 250 mg tablet   No Yes   Sig: Take 2 tablets today then 1 tablet daily till finished   diclofenac potassium (CATAFLAM) 50 mg tablet Not Taking Self No No   Sig: Take 1 tablet every 8 hours as needed for headache   Patient not taking: Reported on 12/7/2024   hydrOXYzine HCL (ATARAX) 25 mg tablet  Self Yes Yes   Sig: Take 25-50 mg by mouth every 6 (six) hours as needed   lamoTRIgine (LaMICtal) 100 mg tablet  Self Yes Yes   Sig: TAKE 25MG TAB DAILY IN ADDITION  MG TAB (TOTAL DAILY DOSE 125MG) FOR 2 WEEKS. THEN INCREASE TO 150MG DAILY FOR TOTAL DOSE 150MG FOR 2 WEEKS, THEN INCREASE TO 25MG DAILY IN ADDITON 150MG FOR TOTAL DAILY DOSE 175MG   loperamide (IMODIUM) 2 mg capsule Not Taking Self No No   Sig: Take 1 capsule (2 mg total) by mouth 4 (four) times a day as needed for diarrhea   Patient not taking: Reported on 12/7/2024   loratadine (CLARITIN) 10 mg tablet Not Taking  Yes No   Sig: Take 10 mg by mouth daily   Patient not taking: Reported on 12/7/2024   methylPREDNISolone 4 MG tablet therapy pack   No Yes   Sig: Use as directed on package   rizatriptan (MAXALT) 10 MG tablet  Self No Yes   Sig: Take 1 tablet (10 mg total) by mouth once as needed for migraine May repeat in 2 hours if needed. Max 2/24 hours, 9/month.      Facility-Administered Medications: None     Discharge Medication List as of 12/7/2024 11:03 PM        START taking these medications    Details   Lidocaine Viscous HCl (XYLOCAINE) 2 % mucosal solution Swish and spit 15 mL 4 (four) times a day as needed for mouth pain or discomfort, Starting Sat 12/7/2024, Normal           CONTINUE these medications which have NOT CHANGED    Details   ARIPiprazole (ABILIFY) 10 mg tablet Take 10 mg by mouth, Starting Tue 11/28/2023, Historical Med      azithromycin (ZITHROMAX) 250 mg tablet Take 2 tablets today then 1 tablet daily till  finished, Normal      hydrOXYzine HCL (ATARAX) 25 mg tablet Take 25-50 mg by mouth every 6 (six) hours as needed, Starting Mon 5/16/2022, Historical Med      lamoTRIgine (LaMICtal) 100 mg tablet TAKE 25MG TAB DAILY IN ADDITION  MG TAB (TOTAL DAILY DOSE 125MG) FOR 2 WEEKS. THEN INCREASE TO 150MG DAILY FOR TOTAL DOSE 150MG FOR 2 WEEKS, THEN INCREASE TO 25MG DAILY IN ADDITON 150MG FOR TOTAL DAILY DOSE 175MG, Historical Med      Magnesium Gluconate (MAGNESIUM 27 PO) Take by mouth as needed, Historical Med      methylPREDNISolone 4 MG tablet therapy pack Use as directed on package, Normal      rizatriptan (MAXALT) 10 MG tablet Take 1 tablet (10 mg total) by mouth once as needed for migraine May repeat in 2 hours if needed. Max 2/24 hours, 9/month., Starting Mon 6/6/2022, Normal      diclofenac potassium (CATAFLAM) 50 mg tablet Take 1 tablet every 8 hours as needed for headache, Normal      loperamide (IMODIUM) 2 mg capsule Take 1 capsule (2 mg total) by mouth 4 (four) times a day as needed for diarrhea, Starting u 9/22/2022, Normal      loratadine (CLARITIN) 10 mg tablet Take 10 mg by mouth daily, Historical Med           No discharge procedures on file.  ED SEPSIS DOCUMENTATION   Time reflects when diagnosis was documented in both MDM as applicable and the Disposition within this note       Time User Action Codes Description Comment    12/7/2024 11:02 PM Memo Choudhury [J02.9] Sore throat                  Memo Choudhury MD  12/09/24 1738

## 2025-04-01 ENCOUNTER — OFFICE VISIT (OUTPATIENT)
Dept: FAMILY MEDICINE CLINIC | Facility: CLINIC | Age: 35
End: 2025-04-01
Payer: COMMERCIAL

## 2025-04-01 VITALS
HEART RATE: 77 BPM | BODY MASS INDEX: 32.89 KG/M2 | HEIGHT: 73 IN | WEIGHT: 248.2 LBS | DIASTOLIC BLOOD PRESSURE: 80 MMHG | SYSTOLIC BLOOD PRESSURE: 130 MMHG | OXYGEN SATURATION: 99 %

## 2025-04-01 DIAGNOSIS — G43.009 MIGRAINE WITHOUT AURA AND WITHOUT STATUS MIGRAINOSUS, NOT INTRACTABLE: ICD-10-CM

## 2025-04-01 DIAGNOSIS — R03.0 WHITE COAT SYNDROME WITHOUT HYPERTENSION: ICD-10-CM

## 2025-04-01 DIAGNOSIS — Z00.00 ANNUAL PHYSICAL EXAM: Primary | ICD-10-CM

## 2025-04-01 DIAGNOSIS — E78.2 MIXED HYPERLIPIDEMIA: ICD-10-CM

## 2025-04-01 DIAGNOSIS — F31.9 BIPOLAR DEPRESSION (HCC): ICD-10-CM

## 2025-04-01 DIAGNOSIS — E66.9 OBESITY (BMI 30-39.9): ICD-10-CM

## 2025-04-01 PROBLEM — F41.1 GAD (GENERALIZED ANXIETY DISORDER): Status: RESOLVED | Noted: 2022-05-16 | Resolved: 2025-04-01

## 2025-04-01 PROBLEM — K52.9 COLITIS: Status: RESOLVED | Noted: 2022-09-22 | Resolved: 2025-04-01

## 2025-04-01 PROCEDURE — 99395 PREV VISIT EST AGE 18-39: CPT | Performed by: FAMILY MEDICINE

## 2025-04-01 NOTE — ASSESSMENT & PLAN NOTE
Symptoms have resolved on no medication no longer follows with neurology  Orders:  •  CBC; Future  •  Comprehensive metabolic panel  •  Lipid Panel with Direct LDL reflex  •  TSH, 3rd generation with Free T4 reflex  •  UA (URINE) with reflex to Scope; Future

## 2025-04-01 NOTE — PROGRESS NOTES
Adult Annual Physical  Name: Cristi Junior      : 1990      MRN: [id number]  Encounter Provider: Dilip Bingham DO  Encounter Date: 2025   Encounter department: [facility]  Patient return in 1 year sooner if needed    Assessment & Plan  Annual physical exam  Annual exam completed  Patient has declined Adacel today  Orders:  •  CBC; Future  •  Comprehensive metabolic panel  •  Lipid Panel with Direct LDL reflex  •  TSH, 3rd generation with Free T4 reflex  •  UA (URINE) with reflex to Scope; Future    Migraine without aura and without status migrainosus, not intractable  Symptoms have resolved on no medication no longer follows with neurology  Orders:  •  CBC; Future  •  Comprehensive metabolic panel  •  Lipid Panel with Direct LDL reflex  •  TSH, 3rd generation with Free T4 reflex  •  UA (URINE) with reflex to Scope; Future    Bipolar depression (HCC)  Symptoms have resolved.  On no medication no longer follows with psychiatry  Orders:  •  CBC; Future  •  Comprehensive metabolic panel  •  Lipid Panel with Direct LDL reflex  •  TSH, 3rd generation with Free T4 reflex  •  UA (URINE) with reflex to Scope; Future    Mixed hyperlipidemia  Blood work ordered  Orders:  •  CBC; Future  •  Comprehensive metabolic panel  •  Lipid Panel with Direct LDL reflex  •  TSH, 3rd generation with Free T4 reflex  •  UA (URINE) with reflex to Scope; Future    Obesity (BMI 30-39.9)   32.75 diet exercise weight loss recommended  Orders:  •  CBC; Future  •  Comprehensive metabolic panel  •  Lipid Panel with Direct LDL reflex  •  TSH, 3rd generation with Free T4 reflex  •  UA (URINE) with reflex to Scope; Future    White coat syndrome without hypertension  Blood pressure normalizes by end of office visit.  On no medication we will continue to monitor off medication       Preventive Screenings:  - Diabetes Screening: screening up-to-date  - Cholesterol Screening: screening not indicated and has  hyperlipidemia   - Hepatitis C screening: screening up-to-date   - HIV screening: screening up-to-date   - Colon cancer screening: screening not indicated   - Lung cancer screening: screening not indicated   - Prostate cancer screening: screening not indicated     Immunizations:  - Immunizations due: Influenza and Tdap  - Risks/benefits immunizations discussed    - The patient declines recommended vaccines currently despite my recommendations      Counseling/Anticipatory Guidance:    - Diet: discussed recommendations for a healthy/well-balanced diet.   - Exercise: the importance of regular exercise/physical activity was discussed. Recommend exercise 3-5 times per week for at least 30 minutes.          History of Present Illness     Adult Annual Physical:  Patient presents for annual physical.     Diet and Physical Activity:  - Diet/Nutrition: no special diet.  - Exercise: walking.    Depression Screening:  - PHQ-2 Score: 0    General Health:  - Sleep: 7-8 hours of sleep on average.  - Hearing: normal hearing right ear and normal hearing left ear.  - Vision: most recent eye exam < 1 year ago and wears glasses and contacts.  - Dental: regular dental visits, brushes teeth twice daily and does not floss.     Health:  - History of STDs: no.   - Urinary symptoms: none.     Advanced Care Planning:  - Has an advanced directive?: no    - Has a durable medical POA?: no    - ACP document given to patient?: yes      Review of Systems   Constitutional: Negative.    HENT: Negative.     Eyes: Negative.    Respiratory: Negative.     Cardiovascular: Negative.    Gastrointestinal: Negative.    Endocrine: Negative.    Genitourinary: Negative.    Musculoskeletal: Negative.    Skin: Negative.    Allergic/Immunologic: Negative.    Neurological:         No migraine headaches, on no medication longer follows with neurology   Hematological: Negative.    Psychiatric/Behavioral:          Patient on no medications, patient's bipolar is in  "remission off medication no longer follows with psychiatry         Objective   /80   Pulse 77   Ht 6' 1\" (1.854 m)   Wt 113 kg (248 lb 3.2 oz)   SpO2 99%   BMI 32.75 kg/m²     Physical Exam  Vitals and nursing note reviewed.   Constitutional:       Appearance: Normal appearance. He is obese.   HENT:      Head: Normocephalic and atraumatic.      Right Ear: Tympanic membrane normal.      Left Ear: Tympanic membrane normal.      Nose: Nose normal.      Mouth/Throat:      Mouth: Mucous membranes are moist.      Pharynx: Oropharynx is clear. No oropharyngeal exudate or posterior oropharyngeal erythema.   Eyes:      General: No scleral icterus.        Right eye: No discharge.         Left eye: No discharge.      Extraocular Movements: Extraocular movements intact.      Conjunctiva/sclera: Conjunctivae normal.      Pupils: Pupils are equal, round, and reactive to light.   Neck:      Vascular: No carotid bruit.   Cardiovascular:      Rate and Rhythm: Normal rate and regular rhythm.      Heart sounds: Normal heart sounds.   Pulmonary:      Effort: Pulmonary effort is normal.      Breath sounds: Normal breath sounds.   Abdominal:      General: Bowel sounds are normal.      Palpations: Abdomen is soft.      Tenderness: There is no abdominal tenderness.   Musculoskeletal:      Cervical back: Neck supple.      Right lower leg: No edema.      Left lower leg: No edema.   Lymphadenopathy:      Cervical: No cervical adenopathy.   Skin:     General: Skin is warm and dry.   Neurological:      General: No focal deficit present.      Mental Status: He is alert and oriented to person, place, and time.      Cranial Nerves: No cranial nerve deficit.   Psychiatric:         Mood and Affect: Mood normal.         Behavior: Behavior normal.         Thought Content: Thought content normal.         Judgment: Judgment normal.         "

## 2025-04-01 NOTE — ASSESSMENT & PLAN NOTE
Blood work ordered  Orders:  •  CBC; Future  •  Comprehensive metabolic panel  •  Lipid Panel with Direct LDL reflex  •  TSH, 3rd generation with Free T4 reflex  •  UA (URINE) with reflex to Scope; Future

## 2025-04-01 NOTE — ASSESSMENT & PLAN NOTE
Blood pressure normalizes by end of office visit.  On no medication we will continue to monitor off medication

## 2025-04-01 NOTE — ASSESSMENT & PLAN NOTE
Symptoms have resolved.  On no medication no longer follows with psychiatry  Orders:  •  CBC; Future  •  Comprehensive metabolic panel  •  Lipid Panel with Direct LDL reflex  •  TSH, 3rd generation with Free T4 reflex  •  UA (URINE) with reflex to Scope; Future

## 2025-04-01 NOTE — PATIENT INSTRUCTIONS
"Complete fasting blood work  Diet exercise weight loss recommended  Return in 1 year's sooner if needed    Patient Education     Routine physical for adults   The Basics   Written by the doctors and editors at [facility]   What is a physical? -- A physical is a routine visit, or \"check-up,\" with your doctor. You might also hear it called a \"wellness visit\" or \"preventive visit.\"  During each visit, the doctor will:   Ask about your physical and mental health   Ask about your habits, behaviors, and lifestyle   Do an exam   Give you vaccines if needed   Talk to you about any medicines you take   Give advice about your health   Answer your questions  Getting regular check-ups is an important part of taking care of your health. It can help your doctor find and treat any problems you have. But it's also important for preventing health problems.  A routine physical is different from a \"sick visit.\" A sick visit is when you see a doctor because of a health concern or problem. Since physicals are scheduled ahead of time, you can think about what you want to ask the doctor.  How often should I get a physical? -- It depends on your age and health. In general, for people age 21 years and older:   If you are younger than 50 years, you might be able to get a physical every 3 years.   If you are 50 years or older, your doctor might recommend a physical every year.  If you have an ongoing health condition, like diabetes or high blood pressure, your doctor will probably want to see you more often.  What happens during a physical? -- In general, each visit will include:   Physical exam - The doctor or nurse will check your height, weight, heart rate, and blood pressure. They will also look at your eyes and ears. They will ask about how you are feeling and whether you have any symptoms that bother you.   Medicines - It's a good idea to bring a list of all the medicines you take to each doctor visit. Your doctor will talk to you about " "your medicines and answer any questions. Tell them if you are having any side effects that bother you. You should also tell them if you are having trouble paying for any of your medicines.   Habits and behaviors - This includes:   Your diet   Your exercise habits   Whether you smoke, drink alcohol, or use drugs   Whether you are sexually active   Whether you feel safe at home  Your doctor will talk to you about things you can do to improve your health and lower your risk of health problems. They will also offer help and support. For example, if you want to quit smoking, they can give you advice and might prescribe medicines. If you want to improve your diet or get more physical activity, they can help you with this, too.   Lab tests, if needed - The tests you get will depend on your age and situation. For example, your doctor might want to check your:   Cholesterol   Blood sugar   Iron level   Vaccines - The recommended vaccines will depend on your age, health, and what vaccines you already had. Vaccines are very important because they can prevent certain serious or deadly infections.   Discussion of screening - \"Screening\" means checking for diseases or other health problems before they cause symptoms. Your doctor can recommend screening based on your age, risk, and preferences. This might include tests to check for:   Cancer, such as breast, prostate, cervical, ovarian, colorectal, prostate, lung, or skin cancer   Sexually transmitted infections, such as chlamydia and gonorrhea   Mental health conditions like depression and anxiety  Your doctor will talk to you about the different types of screening tests. They can help you decide which screenings to have. They can also explain what the results might mean.   Answering questions - The physical is a good time to ask the doctor or nurse questions about your health. If needed, they can refer you to other doctors or specialists, too.  Adults older than 65 years often " need other care, too. As you get older, your doctor will talk to you about:   How to prevent falling at home   Hearing or vision tests   Memory testing   How to take your medicines safely   Making sure that you have the help and support you need at home  All topics are updated as new evidence becomes available and our peer review process is complete.  This topic retrieved from [employer] on: May 02, 2024.  Topic 537005 Version 1.0  Release: 32.4.3 - C32.122  © 2024 UpTo[employer], Inc. and/or its affiliates. All rights reserved.  Consumer Information Use and Disclaimer   Disclaimer: This generalized information is a limited summary of diagnosis, treatment, and/or medication information. It is not meant to be comprehensive and should be used as a tool to help the user understand and/or assess potential diagnostic and treatment options. It does NOT include all information about conditions, treatments, medications, side effects, or risks that may apply to a specific patient. It is not intended to be medical advice or a substitute for the medical advice, diagnosis, or treatment of a health care provider based on the health care provider's examination and assessment of a patient's specific and unique circumstances. Patients must speak with a health care provider for complete information about their health, medical questions, and treatment options, including any risks or benefits regarding use of medications. This information does not endorse any treatments or medications as safe, effective, or approved for treating a specific patient. UpToDate, Inc. and its affiliates disclaim any warranty or liability relating to this information or the use thereof.The use of this information is governed by the Terms of Use, available at https://www.wolterskluwer.com/en/know/clinical-effectiveness-terms. 2024© UpToDate, Inc. and its affiliates and/or licensors. All rights reserved.  Copyright   © 2024 [employer]Date, Inc. and/or its affiliates. All  rights reserved.

## 2025-04-01 NOTE — ASSESSMENT & PLAN NOTE
32.75 diet exercise weight loss recommended  Orders:  •  CBC; Future  •  Comprehensive metabolic panel  •  Lipid Panel with Direct LDL reflex  •  TSH, 3rd generation with Free T4 reflex  •  UA (URINE) with reflex to Scope; Future

## 2025-04-08 ENCOUNTER — APPOINTMENT (OUTPATIENT)
Dept: LAB | Facility: CLINIC | Age: 35
End: 2025-04-08
Payer: COMMERCIAL

## 2025-04-08 DIAGNOSIS — G43.009 MIGRAINE WITHOUT AURA AND WITHOUT STATUS MIGRAINOSUS, NOT INTRACTABLE: ICD-10-CM

## 2025-04-08 DIAGNOSIS — E78.2 MIXED HYPERLIPIDEMIA: ICD-10-CM

## 2025-04-08 DIAGNOSIS — E66.9 OBESITY (BMI 30-39.9): ICD-10-CM

## 2025-04-08 DIAGNOSIS — Z00.00 ANNUAL PHYSICAL EXAM: ICD-10-CM

## 2025-04-08 DIAGNOSIS — F31.9 BIPOLAR DEPRESSION (HCC): ICD-10-CM

## 2025-04-08 LAB
ALBUMIN SERPL BCG-MCNC: 4.6 G/DL (ref 3.5–5)
ALP SERPL-CCNC: 47 U/L (ref 34–104)
ALT SERPL W P-5'-P-CCNC: 64 U/L (ref 7–52)
ANION GAP SERPL CALCULATED.3IONS-SCNC: 5 MMOL/L (ref 4–13)
AST SERPL W P-5'-P-CCNC: 24 U/L (ref 13–39)
BILIRUB SERPL-MCNC: 0.47 MG/DL (ref 0.2–1)
BUN SERPL-MCNC: 13 MG/DL (ref 5–25)
CALCIUM SERPL-MCNC: 9.4 MG/DL (ref 8.4–10.2)
CHLORIDE SERPL-SCNC: 103 MMOL/L (ref 96–108)
CHOLEST SERPL-MCNC: 250 MG/DL (ref ?–200)
CO2 SERPL-SCNC: 32 MMOL/L (ref 21–32)
CREAT SERPL-MCNC: 0.85 MG/DL (ref 0.6–1.3)
ERYTHROCYTE [DISTWIDTH] IN BLOOD BY AUTOMATED COUNT: 11.9 % (ref 11.6–15.1)
GFR SERPL CREATININE-BSD FRML MDRD: 112 ML/MIN/1.73SQ M
GLUCOSE P FAST SERPL-MCNC: 96 MG/DL (ref 65–99)
HCT VFR BLD AUTO: 50.6 % (ref 36.5–49.3)
HDLC SERPL-MCNC: 35 MG/DL
HGB BLD-MCNC: 16.5 G/DL (ref 12–17)
LDLC SERPL CALC-MCNC: 177 MG/DL (ref 0–100)
MCH RBC QN AUTO: 29.9 PG (ref 26.8–34.3)
MCHC RBC AUTO-ENTMCNC: 32.6 G/DL (ref 31.4–37.4)
MCV RBC AUTO: 92 FL (ref 82–98)
PLATELET # BLD AUTO: 247 THOUSANDS/UL (ref 149–390)
PMV BLD AUTO: 11.4 FL (ref 8.9–12.7)
POTASSIUM SERPL-SCNC: 4.5 MMOL/L (ref 3.5–5.3)
PROT SERPL-MCNC: 7.7 G/DL (ref 6.4–8.4)
RBC # BLD AUTO: 5.51 MILLION/UL (ref 3.88–5.62)
SODIUM SERPL-SCNC: 140 MMOL/L (ref 135–147)
TRIGL SERPL-MCNC: 188 MG/DL (ref ?–150)
TSH SERPL DL<=0.05 MIU/L-ACNC: 1.86 UIU/ML (ref 0.45–4.5)
WBC # BLD AUTO: 6.64 THOUSAND/UL (ref 4.31–10.16)

## 2025-04-08 PROCEDURE — 85027 COMPLETE CBC AUTOMATED: CPT

## 2025-04-08 PROCEDURE — 84443 ASSAY THYROID STIM HORMONE: CPT | Performed by: FAMILY MEDICINE

## 2025-04-08 PROCEDURE — 80053 COMPREHEN METABOLIC PANEL: CPT | Performed by: FAMILY MEDICINE

## 2025-04-08 PROCEDURE — 80061 LIPID PANEL: CPT | Performed by: FAMILY MEDICINE

## 2025-04-08 PROCEDURE — 36415 COLL VENOUS BLD VENIPUNCTURE: CPT | Performed by: FAMILY MEDICINE

## 2025-04-10 ENCOUNTER — RESULTS FOLLOW-UP (OUTPATIENT)
Dept: FAMILY MEDICINE CLINIC | Facility: CLINIC | Age: 35
End: 2025-04-10

## 2025-04-15 ENCOUNTER — OFFICE VISIT (OUTPATIENT)
Dept: FAMILY MEDICINE CLINIC | Facility: CLINIC | Age: 35
End: 2025-04-15
Payer: COMMERCIAL

## 2025-04-15 VITALS
SYSTOLIC BLOOD PRESSURE: 132 MMHG | WEIGHT: 247.4 LBS | DIASTOLIC BLOOD PRESSURE: 90 MMHG | HEART RATE: 83 BPM | OXYGEN SATURATION: 93 % | HEIGHT: 73 IN | BODY MASS INDEX: 32.79 KG/M2

## 2025-04-15 DIAGNOSIS — E78.2 MIXED HYPERLIPIDEMIA: ICD-10-CM

## 2025-04-15 DIAGNOSIS — R42 LIGHTHEADED: ICD-10-CM

## 2025-04-15 DIAGNOSIS — G43.009 MIGRAINE WITHOUT AURA AND WITHOUT STATUS MIGRAINOSUS, NOT INTRACTABLE: Primary | ICD-10-CM

## 2025-04-15 DIAGNOSIS — Z97.2 DENTAL ROOT IMPLANT PRESENT: ICD-10-CM

## 2025-04-15 DIAGNOSIS — R51.9 WORSENING HEADACHES: ICD-10-CM

## 2025-04-15 PROBLEM — Z72.0 NICOTINE ABUSE: Status: ACTIVE | Noted: 2025-04-15

## 2025-04-15 PROCEDURE — 99214 OFFICE O/P EST MOD 30 MIN: CPT | Performed by: FAMILY MEDICINE

## 2025-04-15 RX ORDER — SUMATRIPTAN SUCCINATE 100 MG/1
100 TABLET ORAL ONCE AS NEEDED
Qty: 10 TABLET | Refills: 5 | Status: SHIPPED | OUTPATIENT
Start: 2025-04-15

## 2025-04-15 RX ORDER — CHLORAL HYDRATE 500 MG
3000 CAPSULE ORAL DAILY
COMMUNITY

## 2025-04-15 NOTE — ASSESSMENT & PLAN NOTE
Low-fat diet recommended, fish oil 3000 mg daily.  If this is ineffective we will need to start statin  Orders:  •  Comprehensive metabolic panel; Future  •  Lipid Panel with Direct LDL reflex; Future  
No longer follows with neurology will start Imitrex, CT is ordered  Orders:  •  CT head wo contrast; Future  •  SUMAtriptan (IMITREX) 100 mg tablet; Take 1 tablet (100 mg total) by mouth once as needed for migraine for up to 1 dose may repeat in 2 hours if necessary. Max dose 200mg in 24 hour period.  
Detail Level: Detailed

## 2025-04-15 NOTE — PROGRESS NOTES
Name: Cristi Junior      : 1990      MRN: [id number]  Encounter Provider: Dilip Bingham DO  Encounter Date: 4/15/2025   Encounter department: [facility] PRIMARY CARE none return in 3 months for office visit and blood work sooner if needed  :  Assessment & Plan  Migraine without aura and without status migrainosus, not intractable  No longer follows with neurology will start Imitrex, CT is ordered  Orders:  •  CT head wo contrast; Future  •  SUMAtriptan (IMITREX) 100 mg tablet; Take 1 tablet (100 mg total) by mouth once as needed for migraine for up to 1 dose may repeat in 2 hours if necessary. Max dose 200mg in 24 hour period.    Worsening headaches  CT is ordered  Orders:  •  CT head wo contrast; Future    Lightheaded  CT is ordered  Orders:  •  CT head wo contrast; Future    Dental root implant present  Unsure if these are MRI safe because of this we will not do MRI will check CT scan       Mixed hyperlipidemia  Low-fat diet recommended, fish oil 3000 mg daily.  If this is ineffective we will need to start statin  Orders:  •  Comprehensive metabolic panel; Future  •  Lipid Panel with Direct LDL reflex; Future          Tobacco Cessation Counseling: The patient is sincerely urged to quit consumption of tobacco. He is not ready to quit tobacco.       History of Present Illness   Patient has a history of migraines however he is getting them more frequently this past month he had 2 this is unusual for the patient patient also said with his headaches he does get lightheaded negative visual disturbance negative nausea vomiting negative aura.  Blood work was discussed      Review of Systems   Constitutional: Negative.    HENT: Negative.     Eyes: Negative.    Respiratory: Negative.     Cardiovascular: Negative.    Gastrointestinal: Negative.    Endocrine: Negative.    Genitourinary: Negative.    Musculoskeletal: Negative.    Skin: Negative.    Allergic/Immunologic: Negative.    Neurological:      "    HPI   Hematological: Negative.    Psychiatric/Behavioral: Negative.         Objective   /90 (BP Location: Right arm, Patient Position: Sitting, Cuff Size: Standard)   Pulse 83   Ht 6' 1\" (1.854 m)   Wt 112 kg (247 lb 6.4 oz)   SpO2 93%   BMI 32.64 kg/m²      Physical Exam  Vitals and nursing note reviewed.   Constitutional:       General: He is not in acute distress.     Appearance: Normal appearance. He is not ill-appearing, toxic-appearing or diaphoretic.   HENT:      Head: Normocephalic and atraumatic.      Right Ear: Tympanic membrane normal.      Left Ear: Tympanic membrane normal.      Nose: Nose normal.      Mouth/Throat:      Mouth: Mucous membranes are moist.      Pharynx: Oropharynx is clear. No oropharyngeal exudate or posterior oropharyngeal erythema.   Eyes:      General: No scleral icterus.        Right eye: No discharge.         Left eye: No discharge.      Extraocular Movements: Extraocular movements intact.      Conjunctiva/sclera: Conjunctivae normal.      Pupils: Pupils are equal, round, and reactive to light.   Neck:      Vascular: No carotid bruit.   Cardiovascular:      Rate and Rhythm: Normal rate and regular rhythm.      Heart sounds: Normal heart sounds.   Pulmonary:      Effort: Pulmonary effort is normal.      Breath sounds: Normal breath sounds.   Musculoskeletal:      Cervical back: Neck supple.      Right lower leg: No edema.      Left lower leg: No edema.   Lymphadenopathy:      Cervical: No cervical adenopathy.   Skin:     General: Skin is warm and dry.   Neurological:      General: No focal deficit present.      Mental Status: He is alert and oriented to person, place, and time.      Cranial Nerves: No cranial nerve deficit.   Psychiatric:         Mood and Affect: Mood normal.         "

## 2025-04-15 NOTE — PATIENT INSTRUCTIONS
Complete CT of head  Use Imitrex for migraine headache  Start fish oil 3000 mg daily to lower cholesterol  Low-fat low-cholesterol diet  Return in 3 months for office visit and blood work sooner if needed if still getting headaches    Cholesterol and Your Health   AMBULATORY CARE:   Cholesterol  is a waxy, fat-like substance. Cholesterol is made by your body, but also comes from certain foods you eat. Your body uses cholesterol to make hormones and new cells. Your body also uses cholesterol to protect nerves. Cholesterol comes from foods such as meat and dairy products. Your total cholesterol level is made up by LDL cholesterol, HDL cholesterol, and triglycerides:  LDL cholesterol  is called bad cholesterol  because it forms plaque in your arteries. As plaque builds up, your arteries become narrow, and less blood flows through. When plaque decreases blood flow to your heart, you may have chest pain. If plaque completely blocks an artery that bring blood to your heart, you may have a heart attack. Plaque can break off and form blood clots. Blood clots may block arteries in your brain and cause a stroke.           HDL cholesterol  is called good cholesterol  because it helps remove LDL cholesterol from your arteries. It does this by attaching to LDL cholesterol and carrying it to your liver. Your liver breaks down LDL cholesterol so your body can get rid of it. High levels of HDL cholesterol can help prevent a heart attack and stroke. Low levels of HDL cholesterol can increase your risk for heart disease, heart attack, and stroke.     Triglycerides  are a type of fat that store energy from foods you eat. High levels of triglycerides also cause plaque buildup. This can increase your risk for a heart attack or stroke. If your triglyceride level is high, your LDL cholesterol level may also be high.  How food affects your cholesterol levels:   Unhealthy fats  increase LDL cholesterol and triglyceride levels in your blood.  They are found in foods high in cholesterol, saturated fat, and trans fat:     Cholesterol  is found in eggs, dairy, and meat.     Saturated fat  is found in butter, cheese, ice cream, whole milk, and coconut oil. Saturated fat is also found in meat, such as sausage, hot dogs, and bologna.    Trans fat  is found in liquid oils and is used in fried and baked foods. Foods that contain trans fats include chips, crackers, muffins, sweet rolls, microwave popcorn, and cookies.    Healthy fats,  also called unsaturated fats, help lower LDL cholesterol and triglyceride levels. Healthy fats include the following:     Monounsaturated fats  are found in foods such as olive oil, canola oil, avocado, nuts, and olives.    Polyunsaturated fats,  such as omega 3 fats, are found in fish, such as salmon, trout, and tuna. They can also be found in plant foods such as flaxseed, walnuts, and soybeans.  Other things that affect your cholesterol levels:   Smoking cigarettes    Being overweight or obese     Drinking large amounts of alcohol    Not enough exercise or no exercise    Certain genes passed from your parents to you  What you need to know about having your cholesterol levels checked:  Adults 20 to 45 years of age should have their cholesterol levels checked every 4 to 6 years. Adults 45 years and older should have their cholesterol checked every 1 to 2 years. You may need your cholesterol checked more often, or at a younger age, if you have risk factors for heart disease. You may also need to have your cholesterol checked more often if you have other health conditions, such as diabetes. Blood tests are used to check cholesterol levels. Blood tests measure your levels of triglycerides, LDL cholesterol, and HDL cholesterol.   Cholesterol level goals:  Your cholesterol level goal may depend on your risk for heart disease. It may also depend on your age and other health conditions. Ask your healthcare provider if the following goals  are right for you:  Your total cholesterol level  should be less than 200 mg/dL. This number may also depend on your HDL and LDL cholesterol goals.     Your LDL cholesterol level  should be less than 130 mg/dL.     Your HDL cholesterol level  should be 60 mg/dL or higher.     Your triglyceride level  should be less than 150 mg/dL.  Treatment for high cholesterol:  Treatment for high cholesterol will also decrease your risk of heart disease, heart attack, and stroke. Treatment may include any of the following:  Medicines  may be given to lower your LDL cholesterol, triglyceride levels, or total cholesterol level. You may need medicines to lower your cholesterol if any of the following is true:     You have a history of stroke, TIA, unstable angina, or a heart attack    Your LDL cholesterol level is 190 mg/dL or higher    You are age 40 to 75 years of age, have diabetes, and your LDL cholesterol is 70 mg/dL or higher    You are age 40 to 75 years of age, have risk factors for heart disease, and your LDL cholesterol is 70 mg/dL or higher    Lifestyle changes  include changes to your diet, exercise, weight loss, and quitting smoking. It also includes decreasing the amount of alcohol you drink.     Supplements  include fish oil, red yeast rice, and garlic. Fish oil may help lower your triglyceride and LDL cholesterol levels. It may also increase your HDL cholesterol level. Red yeast rice may help decrease your total cholesterol level and LDL cholesterol level. Garlic may help lower your total cholesterol level. Do not take these supplements without talking to your healthcare provider.   Nutrition to help lower your cholesterol levels:  A registered dietitian can help you create a healthy eating plan. Read food labels and choose foods low in saturated fat, trans fats, and cholesterol.  Decrease the total amount of fat you eat.  Choose lean meats, fat-free or 1% fat milk, and low-fat dairy products, such as yogurt and  cheese. Try to limit or avoid red meats. Limit or do not eat fried foods or baked goods such as cookies.     Replace unhealthy fats with healthy fats.  Cook foods in olive oil or canola oil. Choose soft margarines that are low in saturated fat and trans fat. Seeds, nuts, and avocados are other examples of healthy fats.     Eat foods with omega-3 fats.  Examples include salmon, tuna, mackerel, walnuts, and flaxseed. Eat fish 2 times per week. Children and pregnant women should not eat fish that have high levels of mercury, such as shark, swordfish, and eugenia mackerel.    Increase the amount of plant-based foods you eat.  Plant-based foods are low in cholesterol and fat. Eating more of these foods may help lower your cholesterol and help you lose weight. Examples of plant-based foods includes fruits, vegetables, legumes, and whole grains. Replace milk that contains dairy with almond, soy, or coconut milk. Eat beans and foods with soy for protein instead of meat. Ask your healthcare provider or dietitian for more information on plant-based foods.     Increase the amount of fiber you eat.  High-fiber foods can help lower your LDL cholesterol. You should eat between 20 and 30 grams of fiber each day. Eat at least 5 servings of fruits and vegetables each day. Other examples of high-fiber foods include whole-grain or whole-wheat breads, pastas, or cereals, and brown rice. Eat 3 ounces of whole-grain foods each day. Increase fiber slowly. You may have abdominal discomfort, bloating, and gas if you add fiber to your diet too quickly.  Lifestyle changes you can make to help lower your cholesterol levels:   Maintain a healthy weight.  Ask your healthcare provider how much you should weigh. Ask him or her to help you create a weight loss plan if you are overweight. Weight loss can decrease your total cholesterol and triglyceride levels.     Exercise regularly.  Exercise can help lower your total cholesterol level and maintain a  healthy weight. Exercise can also help increase your HDL cholesterol level. Work with your healthcare provider to create an exercise program that is right for you. Get at least 30 minutes of moderate exercise most days of the week. Examples of exercise include brisk walking, swimming, or biking.     Do not smoke.  Nicotine and other chemicals in cigarettes and cigars can damage your lungs, heart, and blood vessels. They can also raise your triglyceride levels. Ask your healthcare provider for information if you currently smoke and need help to quit. E-cigarettes or smokeless tobacco still contain nicotine. Talk to your healthcare provider before you use these products.    Limit or do not drink alcohol.  Alcohol can increase your triglyceride levels. Ask your healthcare provider if it is safe for you to drink alcohol. Also ask how much is safe for you to drink each day.  © 2017 [employer] Information is for End User's use only and may not be sold, redistributed or otherwise used for commercial purposes. All illustrations and images included in CareNotes® are the copyrighted property of [employer] or [employer].  The above information is an  only. It is not intended as medical advice for individual conditions or treatments. Talk to your doctor, nurse or pharmacist before following any medical regimen to see if it is safe and effective for you.